# Patient Record
Sex: FEMALE | Race: BLACK OR AFRICAN AMERICAN | Employment: FULL TIME | URBAN - METROPOLITAN AREA
[De-identification: names, ages, dates, MRNs, and addresses within clinical notes are randomized per-mention and may not be internally consistent; named-entity substitution may affect disease eponyms.]

---

## 2017-08-04 ENCOUNTER — HOSPITAL ENCOUNTER (EMERGENCY)
Age: 22
Discharge: LEFT BEFORE DISCHARGE | End: 2017-08-04
Attending: EMERGENCY MEDICINE
Payer: SELF-PAY

## 2017-08-04 VITALS
RESPIRATION RATE: 16 BRPM | OXYGEN SATURATION: 99 % | HEART RATE: 119 BPM | SYSTOLIC BLOOD PRESSURE: 138 MMHG | TEMPERATURE: 98.4 F | HEIGHT: 64 IN | BODY MASS INDEX: 23.56 KG/M2 | DIASTOLIC BLOOD PRESSURE: 94 MMHG | WEIGHT: 138 LBS

## 2017-08-04 DIAGNOSIS — N89.8 VAGINAL DISCHARGE: Primary | ICD-10-CM

## 2017-08-04 DIAGNOSIS — Z53.20 PATIENT LEFT BEFORE TREATMENT COMPLETED: ICD-10-CM

## 2017-08-04 DIAGNOSIS — A59.9 TRICHOMONAS INFECTION: ICD-10-CM

## 2017-08-04 DIAGNOSIS — Z20.2 EXPOSURE TO GONORRHEA: ICD-10-CM

## 2017-08-04 LAB
APPEARANCE UR: ABNORMAL
BACTERIA URNS QL MICRO: ABNORMAL /HPF
BILIRUB UR QL: NEGATIVE
CAOX CRY URNS QL MICRO: ABNORMAL
COLOR UR: YELLOW
EPITH CASTS URNS QL MICRO: ABNORMAL /LPF (ref 0–5)
GLUCOSE UR STRIP.AUTO-MCNC: NEGATIVE MG/DL
HCG UR QL: NEGATIVE
HGB UR QL STRIP: NEGATIVE
KETONES UR QL STRIP.AUTO: NEGATIVE MG/DL
LEUKOCYTE ESTERASE UR QL STRIP.AUTO: ABNORMAL
NITRITE UR QL STRIP.AUTO: NEGATIVE
PH UR STRIP: 5.5 [PH] (ref 5–8)
PROT UR STRIP-MCNC: NEGATIVE MG/DL
RBC #/AREA URNS HPF: ABNORMAL /HPF (ref 0–5)
SP GR UR REFRACTOMETRY: 1.02 (ref 1–1.03)
TRICHOMONAS UR QL MICRO: ABNORMAL
UROBILINOGEN UR QL STRIP.AUTO: 1 EU/DL (ref 0.2–1)
WBC URNS QL MICRO: ABNORMAL /HPF (ref 0–4)

## 2017-08-04 PROCEDURE — 87491 CHLMYD TRACH DNA AMP PROBE: CPT | Performed by: PHYSICIAN ASSISTANT

## 2017-08-04 PROCEDURE — 99282 EMERGENCY DEPT VISIT SF MDM: CPT

## 2017-08-04 PROCEDURE — 81025 URINE PREGNANCY TEST: CPT | Performed by: PHYSICIAN ASSISTANT

## 2017-08-04 PROCEDURE — 81001 URINALYSIS AUTO W/SCOPE: CPT | Performed by: PHYSICIAN ASSISTANT

## 2017-08-04 RX ORDER — ONDANSETRON 4 MG/1
4 TABLET, ORALLY DISINTEGRATING ORAL ONCE
Status: DISCONTINUED | OUTPATIENT
Start: 2017-08-04 | End: 2017-08-04 | Stop reason: HOSPADM

## 2017-08-04 RX ORDER — AZITHROMYCIN 250 MG/1
2000 TABLET, FILM COATED ORAL
Status: DISCONTINUED | OUTPATIENT
Start: 2017-08-04 | End: 2017-08-04 | Stop reason: HOSPADM

## 2017-08-04 NOTE — ED NOTES
Patient eloped after she states that she do not feel like waiting. Patient is informed that the wait is not too long but she she states that she does not desire to wait. Provider and charged nurse notified at this time.

## 2017-08-04 NOTE — LETTER
8/10/2017 2:29 PM 
 
Ms. Martinez Sees [address] [place] [id number] Dear Ms. [address]: The results of your lab work performed in our office were abnormal and we have had difficulty reaching you by telephone. Please contact our office as soon as possible to discuss these results. Sincerely, SHWETA Watson

## 2017-08-04 NOTE — ED PROVIDER NOTES
HPI Comments: 6:32 PM Susanne Corbett is a 25 y.o. female who presents to the ED for STD/STI exposure. Pt states that her spouse had a positive gonorrhea test and would like to be treated. Pt c/o vaginal discharge, but no odor. She does not want a pelvic exam.  She prefers to just be treated. Pt denies vaginal bleeding, pelvic pain, abdominal pain, back pain, fever, chills, CP, SOB, cough, n/v/d/c, or sore throat. Pt has no other sx or complaints. No past medical history on file. No past surgical history on file. No family history on file. Social History     Social History    Marital status: SINGLE     Spouse name: N/A    Number of children: N/A    Years of education: N/A     Occupational History    Not on file. Social History Main Topics    Smoking status: Never Smoker    Smokeless tobacco: Never Used    Alcohol use No      Comment: occasionally    Drug use: No    Sexual activity: Yes     Partners: Male     Other Topics Concern    Not on file     Social History Narrative         ALLERGIES: Pcn [penicillins]    Review of Systems   Constitutional: Negative for activity change, fatigue and fever. STI/STD exposure    HENT: Negative for congestion and rhinorrhea. Eyes: Negative for visual disturbance. Respiratory: Negative for shortness of breath. Cardiovascular: Negative for chest pain and palpitations. Gastrointestinal: Negative for abdominal pain, constipation, diarrhea, nausea and vomiting. Genitourinary: Positive for vaginal discharge. Negative for dysuria and hematuria. Musculoskeletal: Negative for back pain and myalgias. Skin: Negative for rash. Neurological: Negative for dizziness, weakness and light-headedness.        Vitals:    08/04/17 1804   BP: (!) 138/94   Pulse: (!) 119   Resp: 16   Temp: 98.4 °F (36.9 °C)   SpO2: 99%   Weight: 62.6 kg (138 lb)   Height: 5' 4\" (1.626 m)            Physical Exam   Constitutional: She is oriented to person, place, and time. She appears well-developed and well-nourished. No distress. HENT:   Head: Normocephalic and atraumatic. Eyes: Conjunctivae are normal.   Neck: Normal range of motion. Neck supple. Cardiovascular: Regular rhythm and normal heart sounds. Tachycardia present. Pulmonary/Chest: Effort normal and breath sounds normal. No respiratory distress. She has no wheezes. She has no rales. She exhibits no tenderness. Abdominal: Soft. Bowel sounds are normal. She exhibits no distension. There is no tenderness. There is no rebound and no guarding. Genitourinary:   Genitourinary Comments: Declined pelvic exam.   Musculoskeletal: She exhibits no edema or deformity. Neurological: She is alert and oriented to person, place, and time. She has normal reflexes. Skin: Skin is warm and dry. She is not diaphoretic. Psychiatric: She has a normal mood and affect. Nursing note and vitals reviewed. MDM  Number of Diagnoses or Management Options  Exposure to gonorrhea: new and requires workup  Patient left before treatment completed: new and requires workup  Trichomonas infection: new and requires workup  Vaginal discharge: new and requires workup  Diagnosis management comments: Differential Diagnosis:  Candidiasis, trichomoniasis, bacterial vaginitis, GC/Chlamydia, pregnancy, urethritis/UTI, vaginal foreign body, atrophic vaginitis, contact vulvovaginitis, physiologic discharge    Plan:  Pt presents in NAD with elevated BP, elevated HR, and otherwise normal vitals. Declines pelvic exam.  Ordered treatment for GC/CT, but patient eloped prior to receiving medications. Of note, pt had only been in the department for 50 minutes when she eloped from the room.             Amount and/or Complexity of Data Reviewed  Clinical lab tests: ordered and reviewed  Review and summarize past medical records: yes    Risk of Complications, Morbidity, and/or Mortality  Presenting problems: moderate  Diagnostic procedures: moderate  Management options: moderate    Patient Progress  Patient progress: stable    ED Course       Procedures      -------------------------------------------------------------------------------------------------------------------  Orders:  Orders Placed This Encounter    URINALYSIS W/ RFLX MICROSCOPIC     Standing Status:   Standing     Number of Occurrences:   1    HCG URINE, QL     Standing Status:   Standing     Number of Occurrences:   1    CHLAMYDIA/NEISSERIA AMPLIFICATION     Standing Status:   Standing     Number of Occurrences:   1     Order Specific Question:   Specimen type/source     Answer:   Endocervical [[id number]]    URINE MICROSCOPIC ONLY     Standing Status:   Standing     Number of Occurrences:   1    DISCONTD: azithromycin (ZITHROMAX) tablet 2,000 mg     Order Specific Question:   Antibiotic Indications     Answer:   STD infection    DISCONTD: ondansetron (ZOFRAN ODT) tablet 4 mg      Lab Results:   Recent Results (from the past 12 hour(s))   URINALYSIS W/ RFLX MICROSCOPIC    Collection Time: 08/04/17  6:06 PM   Result Value Ref Range    Color YELLOW      Appearance CLOUDY      Specific gravity 1.022 1.005 - 1.030      pH (UA) 5.5 5.0 - 8.0      Protein NEGATIVE  NEG mg/dL    Glucose NEGATIVE  NEG mg/dL    Ketone NEGATIVE  NEG mg/dL    Bilirubin NEGATIVE  NEG      Blood NEGATIVE  NEG      Urobilinogen 1.0 0.2 - 1.0 EU/dL    Nitrites NEGATIVE  NEG      Leukocyte Esterase MODERATE (A) NEG     HCG URINE, QL    Collection Time: 08/04/17  6:06 PM   Result Value Ref Range    HCG urine, Ql. NEGATIVE  NEG     URINE MICROSCOPIC ONLY    Collection Time: 08/04/17  6:06 PM   Result Value Ref Range    WBC 36 to 50 0 - 4 /hpf    RBC 0 to 3 0 - 5 /hpf    Epithelial cells 4+ 0 - 5 /lpf    Bacteria 1+ (A) NEG /hpf    CA Oxalate crystals 2+ (A) NEG    Trichomonas 1+ (A) NEG     Progress Notes:  0620 PM:  Anita Chávez PA-C was at the pt's bedside, assessed the pt and answered the pt's questions regarding treatment.    -------------------------------------------------------------------------------------------------------------------    Disposition:  Diagnosis:   1. Vaginal discharge    2. Exposure to gonorrhea    3. Patient left before treatment completed    4. Trichomonas infection        Disposition: eloped    Follow-up Information     None          There are no discharge medications for this patient. Scribe Attestation:   I, Rd Chiang, am scribing for and in the presence of Patti Strauss [employer] on this day 08/05/17 at 6:32 PM   patricia Hatch    Provider Attestation:  I personally performed the services described in the documentation, reviewed the documentation, as recorded by the scribe in my presence, and it accurately and completely records my words and actions.   Patti Strauss PA-C. 6:32 PM      Signed by: Patricia Hatch, 6:32 PM

## 2017-08-09 LAB
C TRACH RRNA SPEC QL NAA+PROBE: NEGATIVE
N GONORRHOEA RRNA SPEC QL NAA+PROBE: POSITIVE
SPECIMEN SOURCE: ABNORMAL

## 2017-08-10 NOTE — ED NOTES
Called and left messages with both numbers provided for her to return call. Pt needs tx for Gonorrhea. Letter sent.

## 2017-08-20 ENCOUNTER — HOSPITAL ENCOUNTER (EMERGENCY)
Age: 22
Discharge: HOME | End: 2017-08-20
Attending: EMERGENCY MEDICINE
Payer: SELF-PAY

## 2017-08-20 VITALS
SYSTOLIC BLOOD PRESSURE: 121 MMHG | TEMPERATURE: 98.4 F | DIASTOLIC BLOOD PRESSURE: 79 MMHG | RESPIRATION RATE: 14 BRPM | HEART RATE: 85 BPM

## 2017-08-20 DIAGNOSIS — A54.9 NEISSERIA GONORRHEAE: Primary | ICD-10-CM

## 2017-08-20 PROCEDURE — 99283 EMERGENCY DEPT VISIT LOW MDM: CPT

## 2017-08-20 PROCEDURE — 74011000250 HC RX REV CODE- 250: Performed by: NURSE PRACTITIONER

## 2017-08-20 PROCEDURE — 96372 THER/PROPH/DIAG INJ SC/IM: CPT

## 2017-08-20 PROCEDURE — 74011250636 HC RX REV CODE- 250/636: Performed by: NURSE PRACTITIONER

## 2017-08-20 PROCEDURE — 74011250637 HC RX REV CODE- 250/637: Performed by: NURSE PRACTITIONER

## 2017-08-20 RX ORDER — AZITHROMYCIN 250 MG/1
1000 TABLET, FILM COATED ORAL
Status: COMPLETED | OUTPATIENT
Start: 2017-08-20 | End: 2017-08-20

## 2017-08-20 RX ADMIN — AZITHROMYCIN 1000 MG: 250 TABLET, FILM COATED ORAL at 22:05

## 2017-08-20 RX ADMIN — LIDOCAINE HYDROCHLORIDE 250 MG: 10 INJECTION, SOLUTION EPIDURAL; INFILTRATION; INTRACAUDAL; PERINEURAL at 22:09

## 2017-08-21 NOTE — ED PROVIDER NOTES
HPI Comments: Patient is a 25 y.o.  female with the following medical history:   Past Medical History:  No date: STD (female)  Presents to the ED with Abnormal Lab Results - told by her mother that she got a letter to her home saying that she had an STD and to return to get treated. Not currently having any issues or concerns. Her S.O. Is with her at this time but he has also not been treated. She understands that she needs to go to her GYN or the STD clinic to be evaluated for HIV and Hepatitis. Patient is a 25 y.o. female presenting with abnormal lab results. The history is provided by the patient. Abnormal Lab Results   This is a new problem. Pertinent negatives include no chest pain, no abdominal pain, no headaches and no shortness of breath. Past Medical History:   Diagnosis Date    STD (female)        History reviewed. No pertinent surgical history. History reviewed. No pertinent family history. Social History     Social History    Marital status: SINGLE     Spouse name: N/A    Number of children: N/A    Years of education: N/A     Occupational History    Not on file. Social History Main Topics    Smoking status: Current Every Day Smoker     Types: Cigarettes    Smokeless tobacco: Never Used    Alcohol use No      Comment: occasionally    Drug use: No    Sexual activity: Yes     Partners: Male     Other Topics Concern    Not on file     Social History Narrative         ALLERGIES: Pcn [penicillins]    Review of Systems   Constitutional: Negative for activity change, appetite change, chills and fever. HENT: Negative for trouble swallowing and voice change. Eyes: Negative for discharge, redness and visual disturbance. Respiratory: Negative for cough, chest tightness and shortness of breath. Cardiovascular: Negative for chest pain and leg swelling. Gastrointestinal: Negative for abdominal pain, constipation, diarrhea, nausea and vomiting. Genitourinary: Negative for difficulty urinating, dysuria, flank pain, frequency and urgency. Musculoskeletal: Negative for back pain, joint swelling, neck pain and neck stiffness. Skin: Negative for color change, rash and wound. Neurological: Negative for dizziness, speech difficulty and headaches. Psychiatric/Behavioral: Negative for agitation and behavioral problems. The patient is not nervous/anxious. Vitals:    08/20/17 2051   BP: 121/79   Pulse: 85   Resp: 14   Temp: 98.4 °F (36.9 °C)            Physical Exam   Constitutional: She is oriented to person, place, and time. She appears well-developed and well-nourished. No distress. HENT:   Head: Normocephalic. Eyes: Conjunctivae are normal. No scleral icterus. Cardiovascular: Normal rate, regular rhythm and normal heart sounds. No murmur heard. Pulmonary/Chest: Effort normal and breath sounds normal. No stridor. No respiratory distress. Musculoskeletal: She exhibits no edema. Neurological: She is alert and oriented to person, place, and time. Skin: Skin is warm and dry. Psychiatric: She has a normal mood and affect. Her behavior is normal. Judgment and thought content normal.        MDM  Number of Diagnoses or Management Options  Neisseria gonorrheae:   Diagnosis management comments: The encounter diagnosis was Neisseria gonorrheae.       ED Course       Procedures

## 2017-08-21 NOTE — DISCHARGE INSTRUCTIONS
Gonorrhea: Care Instructions  Your Care Instructions  Gonorrhea is a bacterial infection spread through sexual contact (sexually transmitted infection, or STI). It is found most often in the genital area but it can also infect other areas of the body, such as the rectum or throat. While most people with gonorrhea develop symptoms within a few days after infection, some people have no symptoms. Symptoms of gonorrhea include abnormal bleeding, pain or burning during urination, or a thick discharge from the vagina or penis. Antibiotics can cure gonorrhea. Both sex partners need to be treated to keep from passing the infection back and forth. Follow-up care is a key part of your treatment and safety. Be sure to make and go to all appointments, and call your doctor if you are having problems. Its also a good idea to know your test results and keep a list of the medicines you take. How can you care for yourself at home? · Your doctor probably gave you a shot of antibiotics. If your doctor prescribed antibiotic pills, take them as directed. Do not stop taking them just because you feel better. You need to take the full course of antibiotics. · Do not have sexual contact with anyone while you are being treated. If your treatment was a single dose of antibiotics, wait at least 7 days after taking the dose before you have any sexual contact. Even if you use a condom, you may pass the infection back and forth. · Wash your hands if you touch an area of gonorrhea infection. This will help prevent spreading the infection to other parts of your body or to other people. · Tell your sex partner or partners that you have gonorrhea. They should get treated, whether or not they have symptoms of infection. · Talk to your doctor about being tested again for gonorrhea in 3 months. To prevent gonorrhea in the future  · Use latex condoms every time you have sex.  Use them from the beginning to the end of sexual contact. · Talk to your partner before you have sex. Find out if he or she has or is at risk for gonorrhea or any other STI. Keep in mind that a person may be able to spread an STI even if he or she does not have symptoms. · Do not have sex if you are being treated for gonorrhea or any other STI. · Do not have sex with anyone who has symptoms of an STI, such as sores on the genitals or mouth. · Having one sex partner (who does not have STIs and does not have sex with anyone else) is a good way to avoid STIs. When should you call for help? Call 911 anytime you think you may need emergency care. For example, call if:  · You have sudden, severe pain in your belly or pelvis. Call your doctor now or seek immediate medical care if:  · You have new belly or pelvic pain. · You have unusual vaginal bleeding. · You have a fever. · You have a discharge from the vagina or penis. · You have new or increased burning or pain with urination, or you cannot urinate. · You have pain, swelling, or tenderness in the scrotum. · You have joint pain. · You have pus coming from your eyes. Watch closely for changes in your health, and be sure to contact your doctor if:  · You think you may have been exposed to another STI. · Your symptoms get worse or have not improved within 1 week after starting treatment. · You have any new symptoms, such as sores, bumps, rashes, blisters, or warts in the genital or anal area. · You have a new skin rash. Where can you learn more? Go to http://cliff-luli.info/. Enter O327 in the search box to learn more about \"Gonorrhea: Care Instructions. \"  Current as of: March 20, 2017  Content Version: 11.3  © 3795-4707 [employer]. Care instructions adapted under license by [employer] (which disclaims liability or warranty for this information).  If you have questions about a medical condition or this instruction, always ask your healthcare professional. [employer] disclaims any warranty or liability for your use of this information.

## 2018-06-27 ENCOUNTER — HOSPITAL ENCOUNTER (EMERGENCY)
Age: 23
Discharge: HOME | End: 2018-06-27
Attending: EMERGENCY MEDICINE
Payer: SELF-PAY

## 2018-06-27 VITALS
BODY MASS INDEX: 23.04 KG/M2 | RESPIRATION RATE: 16 BRPM | HEART RATE: 92 BPM | OXYGEN SATURATION: 98 % | SYSTOLIC BLOOD PRESSURE: 126 MMHG | DIASTOLIC BLOOD PRESSURE: 86 MMHG | TEMPERATURE: 97.3 F | HEIGHT: 63 IN | WEIGHT: 130 LBS

## 2018-06-27 DIAGNOSIS — F14.10 COCAINE ABUSE (HCC): Primary | ICD-10-CM

## 2018-06-27 PROCEDURE — 99284 EMERGENCY DEPT VISIT MOD MDM: CPT

## 2018-06-27 NOTE — DISCHARGE INSTRUCTIONS
Cocaine Misuse: Care Instructions  Your Care Instructions    Using cocaine can cause physical and mental harm. It can increase your heart rate and blood pressure, which can lead to a heart attack and even death. It can raise your body temperature. You may have nausea, vomiting, and chills. If you smoke cocaine, the fumes can cause breathing problems. If you snort cocaine, it can damage your nasal passages. If you inject cocaine, it can cause an abscess at the injection site or an infection throughout your body. You may become shaky and restless. You also may see or hear things that are not there (hallucinations), or believe things that are not true. When the doctor treated you, he or she may have:  · Watched your symptoms or done tests to find out how much cocaine was in your body. · Treated you to control your heart rate, temperature, and blood pressure. · Given you oxygen to help you breathe. · Given you medicine to settle your thoughts and help keep you calm. The doctor has checked you carefully, but problems can develop later. If you notice any problems or new symptoms, get medical treatment right away. How can you care for yourself at home? · When you use cocaine regularly, your body and brain get used to it. This is called dependency. If you are dependent on this drug, you may have withdrawal symptoms when you stop using it. You may feel drowsy, have vivid dreams, or feel hungry, tired, or depressed. You may also feel confused and have trouble thinking clearly. To help get past these symptoms:  ¨ Get plenty of rest.  ¨ Drink lots of fluids. ¨ Stay active, but don't tire yourself. ¨ Eat a healthy diet. · Talk to your doctor about drug counseling programs that can help you stop using cocaine. · When you stop using cocaine, you may develop abstinence syndrome, which can last for months.  Symptoms of this may include feeling depressed, being tired, having trouble concentrating, and craving cocaine. When should you call for help? Call 911 anytime you think you may need emergency care. For example, call if:  ? · You have symptoms of a heart attack. These may include:  ¨ Chest pain or pressure, or a strange feeling in the chest.  ¨ Sweating. ¨ Shortness of breath. ¨ Nausea or vomiting. ¨ Pain, pressure, or a strange feeling in the back, neck, jaw, or upper belly or in one or both shoulders or arms. ¨ A fast or irregular heartbeat. After you call 911, the  may tell you to chew 1 adult-strength or 2 to 4 low-dose aspirin. Wait for an ambulance. Do not try to drive yourself. ? · You feel you cannot stop from hurting yourself or someone else. ?Call your doctor now or seek immediate medical care if:  ? · You have severe side effects from using cocaine. These may include problems with thinking, such as seeing things that aren't there or thinking that someone is trying to harm you (paranoia). ? · You have new or worse withdrawal symptoms. ? Watch closely for changes in your health, and be sure to contact your doctor if:  ? · You need more help or support to stop. Where can you learn more? Go to http://cliff-luli.info/. Enter T335 in the search box to learn more about \"Cocaine Misuse: Care Instructions. \"  Current as of: November 3, 2016  Content Version: 11.4  © 2610-3519 [employer]. Care instructions adapted under license by [employer] (which disclaims liability or warranty for this information). If you have questions about a medical condition or this instruction, always ask your healthcare professional. [place] [address] any warranty or liability for your use of this information.

## 2018-06-27 NOTE — ED NOTES
Rounds made. Pt repositioned for comfort. NAD noted. Denies any needs at this time. Sitter remains at bedside for patient safety d/t possible withdrawal. Pt denies any SI/HI at this time.

## 2018-06-27 NOTE — ED NOTES
Change into paper scrubs, belongings placed in plastic bags and labeled. 2 bags, locked in locker 7a. Sitter with pt. Pt insistent on keeping coat.

## 2018-06-27 NOTE — ED PROVIDER NOTES
EMERGENCY DEPARTMENT HISTORY AND PHYSICAL EXAM    8:52 AM      Date: 6/27/2018  Patient Name: Josseline Jerome    History of Presenting Illness     Chief Complaint   Patient presents with    Drug Problem         History Provided By: Patient    Chief Complaint: Drug problem  Duration:  Months  Timing:  N/A  Location: n/a  Quality: n/a  Severity: N/a  Modifying Factors: No modifying or aggravating factors were reported. Associated Symptoms: denies any other associated signs or symptoms      Additional History (Context): 8:52 AM Josseline Jerome is a 26 y.o. female with no pertinent PMHx who presents to ED for evaluation of her cocaine addiction problem. Pt snorts cocaine every day. Denies other drug use or ETOH use. Denies taking daily medications or being allergic to any medications. Denies depression, SI or HI. Patient states she has been awake for 3 days. No modifying or aggravating factors were reported. No other concerns or symptoms at this time. PCP: PROVIDER UNKNOWN    Past History     Past Medical History:  Past Medical History:   Diagnosis Date    STD (female)        Past Surgical History:  No past surgical history on file. Family History:  No family history on file. Social History:  Social History   Substance Use Topics    Smoking status: Current Every Day Smoker     Types: Cigarettes    Smokeless tobacco: Never Used    Alcohol use No      Comment: occasionally       Allergies: Allergies   Allergen Reactions    Pcn [Penicillins] Anaphylaxis         Review of Systems       Review of Systems   Constitutional: Negative for chills, fatigue and fever. Positive for drug problem. HENT: Negative for congestion, rhinorrhea and sore throat. Eyes: Negative for visual disturbance. Respiratory: Negative for cough, shortness of breath and wheezing. Cardiovascular: Negative for chest pain, palpitations and leg swelling.    Gastrointestinal: Negative for abdominal pain, diarrhea, nausea and vomiting. Genitourinary: Negative for difficulty urinating and dysuria. Musculoskeletal: Negative for arthralgias. Skin: Negative for rash. Neurological: Negative for weakness and headaches. Psychiatric/Behavioral: Negative for suicidal ideas. All other systems reviewed and are negative. Physical Exam     Visit Vitals    /86 (BP 1 Location: Left arm, BP Patient Position: At rest)    Pulse 92    Temp 97.3 °F (36.3 °C)    Resp 16    Ht 5' 3\" (1.6 m)    Wt 59 kg (130 lb)    SpO2 98%    BMI 23.03 kg/m2       Physical Exam   Constitutional: She is oriented to person, place, and time. She appears well-developed and well-nourished. No distress. Patient is soundly sleeping and easily aroused. HENT:   Head: Normocephalic and atraumatic. Mouth/Throat: Oropharynx is clear and moist and mucous membranes are normal. No oropharyngeal exudate. Eyes: Conjunctivae and EOM are normal. No scleral icterus. Eyes are blood shot. Pupils are 3 mm and reactive bilaterally. Neck: Normal range of motion. Neck supple. No JVD present. No thyromegaly present. Cardiovascular: Normal rate, regular rhythm, S1 normal, S2 normal, normal heart sounds and intact distal pulses. Exam reveals no gallop and no friction rub. No murmur heard. Pulmonary/Chest: Effort normal and breath sounds normal. No accessory muscle usage. No respiratory distress. She has no wheezes. She has no rhonchi. She has no rales. She exhibits no tenderness. Abdominal: Soft. Normal appearance and bowel sounds are normal. She exhibits no distension and no pulsatile midline mass. There is no tenderness. There is no rebound and no guarding. Musculoskeletal: Normal range of motion. Lymphadenopathy:        Head (right side): No submandibular adenopathy present. She has no cervical adenopathy. Neurological: She is alert and oriented to person, place, and time. Moving all extremities.  No obvious deficits or facial asymmetry. Skin: Skin is warm, dry and intact. No rash noted. No erythema. Psychiatric: She has a normal mood and affect. Her speech is normal and behavior is normal.   Nursing note and vitals reviewed. Medical Decision Making   I am the first provider for this patient. I reviewed the vital signs, available nursing notes, past medical history, past surgical history, family history and social history. Vital Signs-Reviewed the patient's vital signs. Pulse Oximetry Analysis -  98% on room air (Interpretation)    Records Reviewed: Nursing Notes and Old Medical Records (Time of Review: 8:52 AM)      Provider Notes (Medical Decision Making):   ASSESSMENT / PLAN:      24y/o AA woman, no significant PMHx presents requesting help quitting Cocaine. Uses daily, snorts it. Has been doing it for past 3dys and not sleeping. Denies other substance use or ETOH use. No Si/HI. Exam, sleeping soundly, easily aroused, NAD, appears tired/somnolent but interactive, Vitals normal. HEENT w/bloodshot eyes, pupils 3mm/reactive, rest of exam bening. Not toxidromic in any way now. -Will speak with [facility] line about resources  -No acute need for medications or labs/imaging  -Anticipate dc home with outpt f/u. Rajiv Friday, MD  EM-IM Physician      ED Course: Progress Notes, Reevaluation, and Consults:  8:43 AM Spoke with Robin from the [facility] [employer], [employer]. She will come to ED and talk to the patient about resources   11:58 AM [employer] talked to patient and gave her outpatient resources. Agrees that the pt is appropriate for outpatient therapy. Vitals are stable and pt hsa been sleeping in the ED comfortably. Diagnosis     Clinical Impression:   1.  Cocaine abuse        Disposition: discharged    Follow-up Information     Follow up With Details Comments [facility] Call in 1 day  [address] [address]  [phone]    [facility] EMERGENCY DEPT  As needed, If symptoms worsen [address] [place] [address]  [phone]           There are no discharge medications for this patient.    _______________________________    Attestations:  Scribe [address] [place] [address] acting as a scribe for and in the presence of David Bishop MD      June 27, 2018 at 8:52 AM       Provider Attestation:      I personally performed the services described in the documentation, reviewed the documentation, as recorded by the scribe in my presence, and it accurately and completely records my words and actions.  June 27, 2018 at 8:52 AM - David Bishop MD    _______________________________

## 2018-07-25 ENCOUNTER — HOSPITAL ENCOUNTER (INPATIENT)
Age: 23
LOS: 6 days | Discharge: HOME | DRG: 885 | End: 2018-07-31
Attending: EMERGENCY MEDICINE | Admitting: PSYCHIATRY & NEUROLOGY
Payer: SELF-PAY

## 2018-07-25 DIAGNOSIS — F32.A DEPRESSION, UNSPECIFIED DEPRESSION TYPE: Primary | ICD-10-CM

## 2018-07-25 PROBLEM — F32.9 MAJOR DEPRESSION: Status: ACTIVE | Noted: 2018-07-25

## 2018-07-25 LAB
AMPHET UR QL SCN: NEGATIVE
ANION GAP SERPL CALC-SCNC: 5 MMOL/L (ref 3–18)
APPEARANCE UR: CLEAR
BACTERIA URNS QL MICRO: ABNORMAL /HPF
BARBITURATES UR QL SCN: NEGATIVE
BASOPHILS # BLD: 0 K/UL (ref 0–0.1)
BASOPHILS NFR BLD: 0 % (ref 0–2)
BENZODIAZ UR QL: NEGATIVE
BILIRUB UR QL: NEGATIVE
BUN SERPL-MCNC: 9 MG/DL (ref 7–18)
BUN/CREAT SERPL: 11 (ref 12–20)
CALCIUM SERPL-MCNC: 8.3 MG/DL (ref 8.5–10.1)
CANNABINOIDS UR QL SCN: POSITIVE
CHLORIDE SERPL-SCNC: 107 MMOL/L (ref 100–108)
CO2 SERPL-SCNC: 29 MMOL/L (ref 21–32)
COCAINE UR QL SCN: POSITIVE
COLOR UR: YELLOW
CREAT SERPL-MCNC: 0.84 MG/DL (ref 0.6–1.3)
DIFFERENTIAL METHOD BLD: ABNORMAL
EOSINOPHIL # BLD: 0.1 K/UL (ref 0–0.4)
EOSINOPHIL NFR BLD: 1 % (ref 0–5)
EPITH CASTS URNS QL MICRO: ABNORMAL /LPF (ref 0–5)
ERYTHROCYTE [DISTWIDTH] IN BLOOD BY AUTOMATED COUNT: 13.8 % (ref 11.6–14.5)
ETHANOL SERPL-MCNC: <3 MG/DL (ref 0–3)
GLUCOSE SERPL-MCNC: 80 MG/DL (ref 74–99)
GLUCOSE UR STRIP.AUTO-MCNC: NEGATIVE MG/DL
HCG UR QL: NEGATIVE
HCT VFR BLD AUTO: 39.6 % (ref 35–45)
HDSCOM,HDSCOM: ABNORMAL
HGB BLD-MCNC: 13.5 G/DL (ref 12–16)
HGB UR QL STRIP: NEGATIVE
KETONES UR QL STRIP.AUTO: NEGATIVE MG/DL
LEUKOCYTE ESTERASE UR QL STRIP.AUTO: ABNORMAL
LYMPHOCYTES # BLD: 1.8 K/UL (ref 0.9–3.6)
LYMPHOCYTES NFR BLD: 18 % (ref 21–52)
MCH RBC QN AUTO: 32.1 PG (ref 24–34)
MCHC RBC AUTO-ENTMCNC: 34.1 G/DL (ref 31–37)
MCV RBC AUTO: 94.3 FL (ref 74–97)
METHADONE UR QL: NEGATIVE
MONOCYTES # BLD: 0.6 K/UL (ref 0.05–1.2)
MONOCYTES NFR BLD: 6 % (ref 3–10)
NEUTS SEG # BLD: 7.6 K/UL (ref 1.8–8)
NEUTS SEG NFR BLD: 75 % (ref 40–73)
NITRITE UR QL STRIP.AUTO: POSITIVE
OPIATES UR QL: NEGATIVE
PCP UR QL: NEGATIVE
PH UR STRIP: 7 [PH] (ref 5–8)
PLATELET # BLD AUTO: 315 K/UL (ref 135–420)
PMV BLD AUTO: 9.5 FL (ref 9.2–11.8)
POTASSIUM SERPL-SCNC: 4 MMOL/L (ref 3.5–5.5)
PROT UR STRIP-MCNC: NEGATIVE MG/DL
RBC # BLD AUTO: 4.2 M/UL (ref 4.2–5.3)
RBC #/AREA URNS HPF: ABNORMAL /HPF (ref 0–5)
SODIUM SERPL-SCNC: 141 MMOL/L (ref 136–145)
SP GR UR REFRACTOMETRY: 1.02 (ref 1–1.03)
UROBILINOGEN UR QL STRIP.AUTO: 1 EU/DL (ref 0.2–1)
WBC # BLD AUTO: 10.2 K/UL (ref 4.6–13.2)
WBC URNS QL MICRO: ABNORMAL /HPF (ref 0–5)

## 2018-07-25 PROCEDURE — 65220000003 HC RM SEMIPRIVATE PSYCH

## 2018-07-25 PROCEDURE — 85025 COMPLETE CBC W/AUTO DIFF WBC: CPT | Performed by: NURSE PRACTITIONER

## 2018-07-25 PROCEDURE — 99285 EMERGENCY DEPT VISIT HI MDM: CPT

## 2018-07-25 PROCEDURE — 80307 DRUG TEST PRSMV CHEM ANLYZR: CPT | Performed by: NURSE PRACTITIONER

## 2018-07-25 PROCEDURE — 80048 BASIC METABOLIC PNL TOTAL CA: CPT | Performed by: NURSE PRACTITIONER

## 2018-07-25 PROCEDURE — 74011250637 HC RX REV CODE- 250/637: Performed by: EMERGENCY MEDICINE

## 2018-07-25 PROCEDURE — 74011250637 HC RX REV CODE- 250/637: Performed by: PSYCHIATRY & NEUROLOGY

## 2018-07-25 PROCEDURE — 81025 URINE PREGNANCY TEST: CPT | Performed by: NURSE PRACTITIONER

## 2018-07-25 PROCEDURE — 81001 URINALYSIS AUTO W/SCOPE: CPT | Performed by: NURSE PRACTITIONER

## 2018-07-25 RX ORDER — LORAZEPAM 1 MG/1
1-2 TABLET ORAL
Status: DISCONTINUED | OUTPATIENT
Start: 2018-07-25 | End: 2018-07-31 | Stop reason: HOSPADM

## 2018-07-25 RX ORDER — ACETAMINOPHEN 325 MG/1
650 TABLET ORAL ONCE
Status: COMPLETED | OUTPATIENT
Start: 2018-07-25 | End: 2018-07-25

## 2018-07-25 RX ORDER — IBUPROFEN 400 MG/1
400 TABLET ORAL
Status: DISCONTINUED | OUTPATIENT
Start: 2018-07-25 | End: 2018-07-31 | Stop reason: HOSPADM

## 2018-07-25 RX ORDER — NITROFURANTOIN MACROCRYSTALS 50 MG/1
100 CAPSULE ORAL 4 TIMES DAILY
Status: COMPLETED | OUTPATIENT
Start: 2018-07-25 | End: 2018-07-30

## 2018-07-25 RX ORDER — HALOPERIDOL 5 MG/ML
5 INJECTION INTRAMUSCULAR
Status: DISCONTINUED | OUTPATIENT
Start: 2018-07-25 | End: 2018-07-31 | Stop reason: HOSPADM

## 2018-07-25 RX ORDER — HALOPERIDOL 5 MG/1
5 TABLET ORAL
Status: DISCONTINUED | OUTPATIENT
Start: 2018-07-25 | End: 2018-07-31 | Stop reason: HOSPADM

## 2018-07-25 RX ORDER — TRAZODONE HYDROCHLORIDE 50 MG/1
50 TABLET ORAL
Status: DISCONTINUED | OUTPATIENT
Start: 2018-07-25 | End: 2018-07-31 | Stop reason: HOSPADM

## 2018-07-25 RX ORDER — NITROFURANTOIN MACROCRYSTALS 50 MG/1
100 CAPSULE ORAL
Status: COMPLETED | OUTPATIENT
Start: 2018-07-25 | End: 2018-07-25

## 2018-07-25 RX ORDER — LORAZEPAM 2 MG/ML
1-2 INJECTION INTRAMUSCULAR
Status: DISCONTINUED | OUTPATIENT
Start: 2018-07-25 | End: 2018-07-31 | Stop reason: HOSPADM

## 2018-07-25 RX ADMIN — ACETAMINOPHEN 650 MG: 325 TABLET, FILM COATED ORAL at 11:58

## 2018-07-25 RX ADMIN — NITROFURANTOIN MACROCRYSTALS 100 MG: 50 CAPSULE ORAL at 11:58

## 2018-07-25 RX ADMIN — TRAZODONE HYDROCHLORIDE 50 MG: 50 TABLET ORAL at 20:15

## 2018-07-25 RX ADMIN — NITROFURANTOIN MACROCRYSTALS 100 MG: 50 CAPSULE ORAL at 20:15

## 2018-07-25 NOTE — IP AVS SNAPSHOT
[address] [address] [address] Patient: Jacqueline Ca MRN: [id number] [id number] About your hospitalization You were admitted on:  July 25, 2018 You last received care in the:  [facility] 1 SPECIAL TRT 1 You were discharged on:  July 31, 2018 Why you were hospitalized Your primary diagnosis was:  Not on File Your diagnoses also included: Major Depression Follow-up Information Follow up With Details Comments Contact [facility] On 8/6/2018 3:00pm initial intake appointment for continuation of services and treatment. [address] 
[phone] [facility] On 8/9/2018 2:30pm appointment with Dr Chintan Edward for medication management. [address] 
[phone] None   None (395) Patient stated that they have no PCP Discharge Orders None A check angela indicates which time of day the medication should be taken. My Medications START taking these medications Instructions Each Dose to Equal  
 Morning Noon Evening Bedtime ARIPiprazole 5 mg tablet Commonly known as:  ABILIFY Start taking on:  8/1/2018 Your next dose is:  08/01/2018 Take 1 Tab by mouth daily. Indications: DEPRESSION TREATMENT ADJUNCT  
 5 mg With breakfast  
   
   
   
  
 buPROPion  mg tablet Commonly known as:  Edwin Crew Start taking on:  8/1/2018 Your next dose is:  08/01/2018 Take 1 Tab by mouth every morning. Indications: major depressive disorder, Schizoaffective Disorder 150 mg Before breakfast.  
   
   
   
  
  
  
Where to Get Your Medications Information on where to get these meds will be given to you by the nurse or doctor. ! Ask your nurse or doctor about these medications ARIPiprazole 5 mg tablet buPROPion  mg tablet Discharge Instructions ***IMPORTANT NUMBERS*** [address] [facility] 
      [phone] 1917 \[facility]\"" 
     [phone] Suicide Prevention [phone] Patient is alert x3 and ambulatory. Patient has copy of discharge papers with follow up appt. Patient was given 14 days worth of medications at time of discharge. Patient has all personal belongings and has signed form. Patient denies thoughts of self harm or harm to others at this time. Patient armband taken and shredded. Patient discharged to to home address and transportation provided by family friend. Introducing [facility]! [employer] introduces [facility] patient portal. Now you can access parts of your medical record, email your doctor's office, and request medication refills online. 1. In your internet browser, go to [email or web address] 2. Click on the First Time User? Click Here link in the Sign In box. You will see the New Member Sign Up page. 3. Enter your [facility] Access Code exactly as it appears below. You will not need to use this code after youve completed the sign-up process. If you do not sign up before the expiration date, you must request a new code. · [facility] Access Code: [email or web address] Expires: 9/25/2018  7:53 AM 
 
4. Enter the last four digits of your Social Security Number (xxxx) and Date of Birth (mm/dd/yyyy) as indicated and click Submit. You will be taken to the next sign-up page. 5. Create a [facility] Records ID. This will be your [facility] login ID and cannot be changed, so think of one that is secure and easy to remember. 6. Create a [facility] password. You can change your password at any time. 7. Enter your Password Reset Question and Answer. This can be used at a later time if you forget your password. 8. Enter your e-mail address.  You will receive e-mail notification when new information is available in [employer]. 9. Click Sign Up. You can now view and download portions of your medical record. 10. Click the Download Summary menu link to download a portable copy of your medical information. If you have questions, please visit the Frequently Asked Questions section of the [employer] website. Remember, [employer] is NOT to be used for urgent needs. For medical emergencies, dial 911. Now available from your iPhone and Android! Introducing Baldev Shah As a [employer] patient, I wanted to make you aware of our electronic visit tool called Baldev Shah. [employer] 24/7 allows you to connect within minutes with a medical provider 24 hours a day, seven days a week via a mobile device or tablet or logging into a secure website from your computer. You can access Baldev Shah from anywhere in the [place]. A virtual visit might be right for you when you have a simple condition and feel like you just dont want to get out of bed, or cant get away from work for an appointment, when your regular [employer] provider is not available (evenings, weekends or holidays), or when youre out of town and need minor care. Electronic visits cost only $49 and if the [employer] 24/7 provider determines a prescription is needed to treat your condition, one can be electronically transmitted to a nearby pharmacy*. Please take a moment to enroll today if you have not already done so. The enrollment process is free and takes just a few minutes. To enroll, please download the [employer] 24/7 rickie to your tablet or phone, or visit [email or web address] to enroll on your computer. And, as an [address] [place] [address] patient with a [employer] account, the results of your visits will be scanned into your electronic medical record and your primary care provider will be able to view the scanned results. We urge you to continue to see your regular [employer] provider for your ongoing medical care. And while your primary care provider may not be the one available when you seek a [employer] virtual visit, the peace of mind you get from getting a real diagnosis real time can be priceless. For more information on [employer], view our Frequently Asked Questions (FAQs) at [email or web address]. Sincerely, 
 
Arelis Mcclain MD 
Chief Medical Officer Tim8 Amy Sue *:  certain medications cannot be prescribed via [employer] Providers Seen During Your Hospitalization Provider Specialty Primary office phone Rolando Acevedo MD Emergency Medicine [phone] Kendra Hernandez MD Psychiatry [phone] Your Primary Care Physician (PCP) Primary Care Physician Office Phone Office Fax NONE ** None ** ** None ** You are allergic to the following Allergen Reactions Pcn (Penicillins) Anaphylaxis Recent Documentation Height Weight BMI OB Status Smoking Status 1.651 m 63.5 kg 23.3 kg/m2 Having regular periods Current Every Day Smoker Emergency Contacts Name Discharge Info Relation Home Work Mobile [address] CAREGIVER [3] Parent [1] [phone] Patient Belongings The following personal items are in your possession at time of discharge: 
  Dental Appliances: None         Home Medications: None   Jewelry: None  Clothing: Slippers, Pants (hoodie locker 103/1)    Other Valuables: None  Personal Items Sent to Safe: none Please provide this summary of care documentation to your next provider. Signatures-by signing, you are acknowledging that this After Visit Summary has been reviewed with you and you have received a copy. Patient Signature:  ____________________________________________________________ Date:  ____________________________________________________________  
  
Burlene Sunday Provider Signature:  ____________________________________________________________ Date:  ____________________________________________________________

## 2018-07-25 NOTE — IP AVS SNAPSHOT
Summary of Care Report The Summary of Care report has been created to help improve care coordination. Users with access to [employer] or Patient Communicator [facility] (Web-based application) may access additional patient information including the Discharge Summary. If you are not currently a [address] user and need more information, please call the number listed below in the [place] [address] section and ask to be connected with Medical Records. Facility Information Name Address Phone [address] [place] [address] [address] [facility] [address] [phone] Patient Information Patient Name Sex  Jaxon Licea ([id number]) Female 1995 Discharge Information Admitting Provider Service Area Unit Marcelino Lew MD / [address]  [phone] Discharge Provider Discharge Date/Time Discharge Disposition Destination (none) 2018 (Pending) AHR (none) Patient Language Language ENGLISH [13] Hospital Problems as of 2018  Reviewed: 2011  4:34 PM by Andrew Peace MD  
  
  
  
 Class Noted - Resolved Last Modified POA Active Problems Major depression  2018 - Present 2018 by Marcelino Lew MD Unknown Entered by Marcelino Lew MD  
  
Non-Hospital Problems as of 2018  Reviewed: 2011  4:34 PM by Andrew Peace MD  
  
  
  
 Class Noted - Resolved Last Modified Active Problems Amenorrhea, secondary  2011 - Present 2011 by Darwin Reardon. Entered by Darwin Reardon. Pregnancy, normal  2011 - Present 2011 by Darwin Reardon. Entered by Darwin Reardon. Teen pregnancy  2011 - Present 2011 by Darwin Reardon. Entered by Darwin Reardon. You are allergic to the following Allergen Reactions Pcn (Penicillins) Anaphylaxis Current Discharge Medication List  
  
START taking these medications Dose & Instructions Dispensing Information Comments ARIPiprazole 5 mg tablet Commonly known as:  ABILIFY Start taking on:  8/1/2018 Dose:  5 mg Take 1 Tab by mouth daily. Indications: DEPRESSION TREATMENT ADJUNCT Quantity:  30 Tab Refills:  0  
   
 buPROPion  mg tablet Commonly known as:  Katharina Veras Start taking on:  8/1/2018 Dose:  150 mg Take 1 Tab by mouth every morning. Indications: major depressive disorder, Schizoaffective Disorder Quantity:  30 Tab Refills:  0 Follow-up Information Follow up With Details Comments Contact Info [facility] On 8/6/2018 3:00pm initial intake appointment for continuation of services and treatment. [address] 
[phone] [facility] On 8/9/2018 2:30pm appointment with Dr Carl Marroquin for medication management. [address] 
[phone] None   None (395) Patient stated that they have no PCP Discharge Instructions ***IMPORTANT NUMBERS*** [facility] 
      [phone] [facility] 
     [phone] Suicide Prevention [phone] Patient is alert x3 and ambulatory. Patient has copy of discharge papers with follow up appt. Patient was given 14 days worth of medications at time of discharge. Patient has all personal belongings and has signed form. Patient denies thoughts of self harm or harm to others at this time. Patient armband taken and shredded. Patient discharged to to home address and transportation provided by family friend. Chart Review Routing History No Routing History on File

## 2018-07-25 NOTE — BH NOTES
Pt arrived on unit with significant other and hospital security. Patient was happy, smiling, and laughing with friend. Pts vitals were stable upon admission. Pts belongins where reviewed with the patient. All valuables were given to boyfriend to take home. Pt was appropriate and cooperative during the initial interview. Pt stated she was here because \"she just needed a break. \" Her stressors that she voiced is having custody issues with her mother over her 10year old daughter. Pt became tearful while talking about her daughter, but once conversation was changed she became happy and upbeat once again. Pt states she is not suicidal at this time, just depressed about life. She denied use of illegal drugs, alcohol, and tobacco products. Pt stated her only chronic condition was depression which she has had for 2 years. Pt currently resides with her boy friend. Her daughter is currently residing with the patients mother. 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

## 2018-07-25 NOTE — ED PROVIDER NOTES
EMERGENCY DEPARTMENT HISTORY AND PHYSICAL EXAM    1:31 PM      Date: 7/25/2018  Patient Name: Angelic Castro    History of Presenting Illness     Chief Complaint   Patient presents with    Suicidal       History Provided By: Patient    Chief Complaint: Suicidal Ideation  Duration:  Weeks  Timing:  Constant and Worsening  Location: Generalized  Quality: N/A  Severity: Moderate  Modifying Factors: Recent family stress and arguments with her mother triggered suicidal thoughts  Associated Symptoms: denies any other associated signs or symptoms      Additional History (Context): Angelic Castro is a 26 y.o. female with No significant past medical history who presents to the ED complaining of constant, worsening, suicidal ideation that began 2 weeks ago, with no plan. The patient states that recent family stress and arguments with her mother triggered her suicidal thoughts. She remarks that she has never had SI before. She does not have and therapist or psychiatrist and is not on psychiatric medication. The patient denies HI, hallucinations, EtOH use, drug use, dysuria, and any pain. No other complaints or concerns at this time. PCP: None      Past History     Past Medical History:  Past Medical History:   Diagnosis Date    STD (female)        Past Surgical History:  History reviewed. No pertinent surgical history. Family History:  History reviewed. No pertinent family history. Social History:  Social History   Substance Use Topics    Smoking status: Current Every Day Smoker     Types: Cigarettes    Smokeless tobacco: Never Used    Alcohol use No      Comment: occasionally       Allergies: Allergies   Allergen Reactions    Pcn [Penicillins] Anaphylaxis         Review of Systems       Review of Systems   Constitutional: Negative for fever. Genitourinary: Negative for dysuria. Psychiatric/Behavioral: Positive for suicidal ideas. Negative for hallucinations.    All other systems reviewed and are negative. Physical Exam     Patient Vitals for the past 12 hrs:   Temp Pulse Resp BP SpO2   07/25/18 0954 98.6 °F (37 °C) 85 16 118/82 100 %         Physical Exam   Constitutional: She is oriented to person, place, and time. She appears well-developed. HENT:   Head: Normocephalic and atraumatic. Eyes: Conjunctivae and EOM are normal.   Neck: Normal range of motion. Cardiovascular: Normal heart sounds. Exam reveals no gallop and no friction rub. No murmur heard. Pulmonary/Chest: Effort normal and breath sounds normal. No stridor. Abdominal: Soft. There is no tenderness. Musculoskeletal: Normal range of motion. She exhibits no tenderness. Neurological: She is alert and oriented to person, place, and time. Skin: Skin is warm and dry. She is not diaphoretic. Psychiatric: She has a normal mood and affect. Her behavior is normal. She expresses suicidal ideation. Odd affect  Inappropriately smiling  Depressed mood   Nursing note and vitals reviewed.         Diagnostic Study Results     Labs -  Recent Results (from the past 12 hour(s))   URINALYSIS W/ RFLX MICROSCOPIC    Collection Time: 07/25/18 10:00 AM   Result Value Ref Range    Color YELLOW      Appearance CLEAR      Specific gravity 1.024 1.005 - 1.030      pH (UA) 7.0 5.0 - 8.0      Protein NEGATIVE  NEG mg/dL    Glucose NEGATIVE  NEG mg/dL    Ketone NEGATIVE  NEG mg/dL    Bilirubin NEGATIVE  NEG      Blood NEGATIVE  NEG      Urobilinogen 1.0 0.2 - 1.0 EU/dL    Nitrites POSITIVE (A) NEG      Leukocyte Esterase MODERATE (A) NEG     DRUG SCREEN, URINE    Collection Time: 07/25/18 10:00 AM   Result Value Ref Range    BENZODIAZEPINES NEGATIVE  NEG      BARBITURATES NEGATIVE  NEG      THC (TH-CANNABINOL) POSITIVE (A) NEG      OPIATES NEGATIVE  NEG      PCP(PHENCYCLIDINE) NEGATIVE  NEG      COCAINE POSITIVE (A) NEG      AMPHETAMINES NEGATIVE  NEG      METHADONE NEGATIVE  NEG      HDSCOM (NOTE)    HCG URINE, QL    Collection Time: 07/25/18 10:00 AM   Result Value Ref Range    HCG urine, QL NEGATIVE  NEG     URINE MICROSCOPIC ONLY    Collection Time: 07/25/18 10:00 AM   Result Value Ref Range    WBC 25 to 35 0 - 5 /hpf    RBC 1 to 3 0 - 5 /hpf    Epithelial cells 1+ 0 - 5 /lpf    Bacteria 4+ (A) NEG /hpf   METABOLIC PANEL, BASIC    Collection Time: 07/25/18 11:17 AM   Result Value Ref Range    Sodium 141 136 - 145 mmol/L    Potassium 4.0 3.5 - 5.5 mmol/L    Chloride 107 100 - 108 mmol/L    CO2 29 21 - 32 mmol/L    Anion gap 5 3.0 - 18 mmol/L    Glucose 80 74 - 99 mg/dL    BUN 9 7.0 - 18 MG/DL    Creatinine 0.84 0.6 - 1.3 MG/DL    BUN/Creatinine ratio 11 (L) 12 - 20      GFR est AA >60 >60 ml/min/1.73m2    GFR est non-AA >60 >60 ml/min/1.73m2    Calcium 8.3 (L) 8.5 - 10.1 MG/DL   CBC WITH AUTOMATED DIFF    Collection Time: 07/25/18 11:17 AM   Result Value Ref Range    WBC 10.2 4.6 - 13.2 K/uL    RBC 4.20 4. 20 - 5.30 M/uL    HGB 13.5 12.0 - 16.0 g/dL    HCT 39.6 35.0 - 45.0 %    MCV 94.3 74.0 - 97.0 FL    MCH 32.1 24.0 - 34.0 PG    MCHC 34.1 31.0 - 37.0 g/dL    RDW 13.8 11.6 - 14.5 %    PLATELET 898 489 - 311 K/uL    MPV 9.5 9.2 - 11.8 FL    NEUTROPHILS 75 (H) 40 - 73 %    LYMPHOCYTES 18 (L) 21 - 52 %    MONOCYTES 6 3 - 10 %    EOSINOPHILS 1 0 - 5 %    BASOPHILS 0 0 - 2 %    ABS. NEUTROPHILS 7.6 1.8 - 8.0 K/UL    ABS. LYMPHOCYTES 1.8 0.9 - 3.6 K/UL    ABS. MONOCYTES 0.6 0.05 - 1.2 K/UL    ABS. EOSINOPHILS 0.1 0.0 - 0.4 K/UL    ABS. BASOPHILS 0.0 0.0 - 0.1 K/UL    DF AUTOMATED     ETHYL ALCOHOL    Collection Time: 07/25/18 11:17 AM   Result Value Ref Range    ALCOHOL(ETHYL),SERUM <3 0 - 3 MG/DL       Radiologic Studies -   No orders to display         Medical Decision Making     Provider Notes (Medical Decision Making): Based on my history, physical exam, and diagnostic evaluation, the patient appears to have symptoms consistent with severe depression. There has been suicidal ideation without a plan.  They appear to meet criteria for admission and appears to be gravely disabled by their psychiatric illness. The patient does not have any clinical evidence of head trauma, infection, significant electrolyte abnormalities or toxidromes. My evaluation is that the pt is at risk for harming self  if discharged. After history, physical exam and required diagnostic evaluation; pt appears to be medically stable for psychiatric admission. She was seen by the psychiatric  who discussed the case with the psychiatrist on call. Pt was admitted to the psychatric floor in stable condition. I am the first provider for this patient. I reviewed the vital signs, available nursing notes, past medical history, past surgical history, family history and social history. Vital Signs-Reviewed the patient's vital signs. Pulse Oximetry Analysis -  100% on room air (Interpretation) WNL    Records Reviewed: Nursing Notes (Time of Review: 1:31 PM)    ED Course: Progress Notes, Reevaluation, and Consults:  11:51 AM paged crisis for 21 and she is medically clear. Diagnosis     Clinical Impression: Depression    Disposition: Admit    Follow-up Information     None           Patient's Medications    No medications on file     _______________________________    Attestations:  Scribe Lee Silber acting as a scribe for and in the presence of Silviano Singer MD      July 25, 2018 at 1:31 PM       Provider Attestation:      I personally performed the services described in the documentation, reviewed the documentation, as recorded by the scribe in my presence, and it accurately and completely records my words and actions.  July 25, 2018 at 1:31 PM - Silviano Singer MD    _______________________________

## 2018-07-25 NOTE — BSMART NOTE
Comprehensive Assessment Form Part 1    Section I - Disposition      The Medical Doctor to Psychiatrist conference was completed. The Medical Doctor is in agreement with Psychiatrist disposition because of Patient reports suicidal thoughts with a plan. [place] [facility] [place]  The on-call Psychiatrist consulted was Dr. Naye Hudson  The admitting Psychiatrist will be Dr. Dr. Naye Hudson. The admitting Diagnosis is MDD   Admitted to room 103/01  Unit STU1      Section II - Integrated Summary  Summary:  Patient is a 26year old [facility] [employer] female who presents with suicidal ideations. Patient is alone and reports she is \"tired of it all and I feel like giving up\". Patients mood and affect is flat. Patient reports she was driven to the hospital by her boyfriend who began noticing her voicing strange thoughts about harming herself. Patient reports feelings of worthlessness, hopelessness and helplessness. Patient reports situational stressors related to finances. Patient reports she has a 10year old daughter who currently resides with her mother. As interview progressed patient displayed thought blocking pattern when answering  questions. Patient is pleasant and cooperative with interview. Patient denies a history of hospitalizations and mental illness. Patient endorses SI. Patient denies HI/AVH. Patient is oriented to person place and situation. The patients memory shows no evidence of impairment. The patient is an immediate threat to herself and is having thoughts of self harm with her depression. The patient is deemed competent to provide informed consent. The Chief Complaint is Suicidal thoughts. Section V - Substance Abuse  The patient reports she is not using substances.       Keyanna Mukherjee, LOUIE, LCSW-E    [phone]

## 2018-07-26 PROCEDURE — 74011250637 HC RX REV CODE- 250/637: Performed by: PSYCHIATRY & NEUROLOGY

## 2018-07-26 PROCEDURE — 65220000003 HC RM SEMIPRIVATE PSYCH

## 2018-07-26 RX ORDER — ARIPIPRAZOLE 5 MG/1
5 TABLET ORAL DAILY
Status: DISCONTINUED | OUTPATIENT
Start: 2018-07-27 | End: 2018-07-26

## 2018-07-26 RX ORDER — ARIPIPRAZOLE 5 MG/1
5 TABLET ORAL DAILY
Status: DISCONTINUED | OUTPATIENT
Start: 2018-07-26 | End: 2018-07-31 | Stop reason: HOSPADM

## 2018-07-26 RX ORDER — BUPROPION HYDROCHLORIDE 150 MG/1
150 TABLET ORAL
Status: DISCONTINUED | OUTPATIENT
Start: 2018-07-26 | End: 2018-07-31 | Stop reason: HOSPADM

## 2018-07-26 RX ADMIN — BUPROPION HYDROCHLORIDE 150 MG: 150 TABLET, FILM COATED, EXTENDED RELEASE ORAL at 15:52

## 2018-07-26 RX ADMIN — NITROFURANTOIN MACROCRYSTALS 100 MG: 50 CAPSULE ORAL at 08:20

## 2018-07-26 RX ADMIN — NITROFURANTOIN MACROCRYSTALS 100 MG: 50 CAPSULE ORAL at 15:52

## 2018-07-26 RX ADMIN — NITROFURANTOIN MACROCRYSTALS 100 MG: 50 CAPSULE ORAL at 20:23

## 2018-07-26 RX ADMIN — NITROFURANTOIN MACROCRYSTALS 100 MG: 50 CAPSULE ORAL at 11:51

## 2018-07-26 RX ADMIN — ARIPIPRAZOLE 5 MG: 5 TABLET ORAL at 15:53

## 2018-07-26 RX ADMIN — TRAZODONE HYDROCHLORIDE 50 MG: 50 TABLET ORAL at 20:22

## 2018-07-26 RX ADMIN — LORAZEPAM 1 MG: 1 TABLET ORAL at 10:29

## 2018-07-26 NOTE — H&P
HPI: Frank Christensen is a 26 y.o. AA female admitted for SI. She is an inconsistent historian. The  interviewing her in the ED noted Mario Hoffmann said she is 'tired of it all and I feel like giving up'. Patients mood and affect is flat. Patient reports she was driven to the hospital by her boyfriend who began noticing her voicing strange thoughts about harming herself. Patient reports feelings of worthlessness, hopelessness and helplessness. Patient reports situational stressors related to finances. Patient reports she has a 10year old daughter who currently resides with her mother. As interview progressed patient displayed thought blocking pattern when answering  questions. Patient endorses SI.\" Once she arrived on the unit nursing noted her \"to be happy, smiling, and laughing with her boyfriend. At that time she stated she was here because 'she just needed a break.' Her stressors that she voiced is having custody issues with her mother over her 10year old daughter. Pt became tearful while talking about her daughter, but once conversation was changed she became happy and upbeat once again. Pt states she is not suicidal at this time, just depressed about life. She denied use of illegal drugs, alcohol, and tobacco products. Pt stated her only chronic condition was depression which she has had for 2 years. \" On my admission interview she was inappropriately smiling while saying she was depressed. She said she had been depressed for only a few days and that she had insomnia. She denied previous psychiatric history, previous suicide attempts, SI, HI, AH, or VH. She denied a history of symptoms of sal. PPH: She denies any past psychitric history. PMH: History of HTN, Migraines, obesity, and hyperlipidemia     SA: She denies any alcohol or drug use, however, on admission her drug screen was positive for Cocaine and THC. SH: Her 10 y.o. Daughter lives with her mother.  She reports financial stress over her mother wanting her to pay for her daughter's upcoming birthday party. She reports that she is a \"sugar baby\" and that she has lived for 3 years with her \"sugar daddy,\" who is 48. She says she likes older men and has had other \"sugar daddies\" in the past. She says he takes care of her and she doesn't work. Prior to this relationship she worked at [employer]. MSE: Young AA female with good eye contact but inappropriately smiling and laughing as she talks about being depressed. Thought appeared to be goal directed and without tangentiality, circumstantiality, or flight of ideas. Mood is labile with inappropriate affect. Denies SI, HI, AH, or VH. No illusions or delusions. Judgement and insight fair to poor. Assessment:  Schizoaffective Disorder, Bipolar Type F25.0  I believe this is the most likely diagnosis given her inappropriate affect and thought blocking noted in the ED. Plan:  Wellbutrin  mg. every day  Abilify 5 mg. every day    Estimated length of stay: 1 week. Iraj Baum MD  Psychiatry

## 2018-07-26 NOTE — BSMART NOTE
OCCUPATIONAL THERAPY PROGRESS NOTE  Group Time:  0889  Attendance: The patient attended 1/3 of group. The patient left and returned to activity at least once several times. Participation:  The patient participated with moderate elaboration in the activity. Attention:  The patient needed frequent redirection to activity. .  Interaction:  The patient frequently interacts with others. The patient shows limited awareness of others. Talkative with peers, laughing, unable to focus on activity, somewhat silly in responses, left and returned several times before finally leaving when redirected to be serious in responses and focus as she was disruptive and talking with peers over others.

## 2018-07-26 NOTE — BH NOTES
GROUP THERAPY PROGRESS NOTE    Marily Shields Participated in Goals Group. Group time: 30 minutes    Personal goal for participation: Share goals and positive characteristics about self. Goal orientation: personal    Group therapy participation: active    Therapeutic interventions reviewed and discussed:  \" Life in my hands\". The group traced their hand on the back and front of a piece of paper. They put five positive characteristics ,and also symbols to describe themselves. The other hand was used to put five things that they could work on to improve their well being. They shared their \" Life in my hands\" with the staff ( me) , and also the rest of the group.     Impression of participation: The patient was extremely active and positive in group

## 2018-07-26 NOTE — BH NOTES
MHT NOTE: The patient has had a good day today as evidenced by her having a positive attitude in both the morning and afternoon, and also remaining out in the day room for the majority of the day playing games and socializing with surrounding patients with a contagious smile on her face. The patient participated in breakfast, lunch and also the groups that were provided today by staff. The patient complied with utilizing the non skid socks that were provided for her safety, and did not experience any falls this shift. The patient will continue to be monitored for location and safety for the remainder of the shift.

## 2018-07-26 NOTE — BH NOTES
GROUP THERAPY PROGRESS NOTE    Jacqueline Ca is participating in AA/Emotions Anonymous Group    Group time: 4569-0242    Personal goal for participation:  How to know When to Seek Treatment for Alcoholism. Anyone experiencing emotional difficulties. Goal orientation: active    Group therapy participation: fully participated    Therapeutic interventions reviewed and discussed: When people talk about what is going on in their lives it allows them to release some of their pent up stress. To gain knowledge and support from others who have had or are currently experiencing similar issues. Impression of participation:  [employer] members reviewed a film, then  Discussed the importance of sharing at [facility] , help yourself out of depression, leave anxiety behind, and controlling your fears. .  Pt.  Received information  for both programs and shared while in group

## 2018-07-26 NOTE — BSMART NOTE
SOCIAL WORK GROUP THERAPY PROGRESS NOTE    Group Time:  11am    Group Topic:  Coping Skills    C D Issues    Group Participation:      Pt moderately involved during group discussion but remained attentive. Minimal self disclosure. Discussion included the process of making \"Change\" by answering questions on handout with an emphasis on strengths & weaknesses to support improving one's self esteem. \"I need to work on my mood shifts\".

## 2018-07-27 PROCEDURE — 65220000003 HC RM SEMIPRIVATE PSYCH

## 2018-07-27 PROCEDURE — 74011250637 HC RX REV CODE- 250/637: Performed by: PSYCHIATRY & NEUROLOGY

## 2018-07-27 RX ADMIN — NITROFURANTOIN MACROCRYSTALS 100 MG: 50 CAPSULE ORAL at 20:06

## 2018-07-27 RX ADMIN — NITROFURANTOIN MACROCRYSTALS 100 MG: 50 CAPSULE ORAL at 12:11

## 2018-07-27 RX ADMIN — ARIPIPRAZOLE 5 MG: 5 TABLET ORAL at 08:11

## 2018-07-27 RX ADMIN — TRAZODONE HYDROCHLORIDE 50 MG: 50 TABLET ORAL at 20:06

## 2018-07-27 RX ADMIN — BUPROPION HYDROCHLORIDE 150 MG: 150 TABLET, FILM COATED, EXTENDED RELEASE ORAL at 06:34

## 2018-07-27 RX ADMIN — HALOPERIDOL 5 MG: 5 TABLET ORAL at 12:12

## 2018-07-27 RX ADMIN — NITROFURANTOIN MACROCRYSTALS 100 MG: 50 CAPSULE ORAL at 08:11

## 2018-07-27 RX ADMIN — NITROFURANTOIN MACROCRYSTALS 100 MG: 50 CAPSULE ORAL at 17:13

## 2018-07-27 NOTE — BSMART NOTE
Pt. Is a 26year old female with a history of Depression. Pt. Was admitted to this [facility] for ideations with a plan. SW Contact:  SW met with pt to discuss the reason for this admission. Pt stated she has been suffering for depression for awhile. Pt. Expressed she is no longer having thoughts to harm self. Pt. denies hallucinations. Pt. Had some thought blocking during the interview. Pt stated she lives with her boyfriend and has a 3year old daughter. Pt. Than stated she has a 10year old. The 10year old is being care for by her mother. Pt. Stated she only has one child. Pt. Appears a little disorganized. Pt. admits she is stressed out financially. Pt. Could not elaborate. RENÉE discussed mental health services in the community. Pt stated she has an intake appointment scheduled in August at [facility]. SW will assist pt. With d/c planning by provided community resources for the pt. SW made pt aware she tested positive for THC and Cocaine. Pt stated her boyfriend gave her a joint.

## 2018-07-27 NOTE — PROGRESS NOTES
Problem: Suicide/Homicide (Adult/Pediatric)  Goal: *STG: Remains safe in hospital  Remains safe while in hospital daily. Outcome: Progressing Towards Goal  Patient remains safe every day while hospitalized. Goal: *STG: Attends activities and groups  Attends activities and groups daily while in hospital.   Outcome: Progressing Towards Goal  Patient attends 2-3 group therapy every day while hospitalized. Comments: Patient is cooperative, patient takes medications as prescribed, patient goes to group therapy, patient denies thoughts of suicide, patient denies hallucinations, patient denies delusions will continue to monitor.

## 2018-07-27 NOTE — BSMART NOTE
SOCIAL WORK GROUP THERAPY PROGRESS NOTE    Group Time: 11am    Group Topic:  Coping Skills    Group Participation:     Pt expressed not interested in attending session despite staff support

## 2018-07-27 NOTE — BH NOTES
Patient's , Ms Diony Aggarwal, called to verify patient's admission due to patient missing an appointment with her. Patient signed release of information form for information to be released. Ms Diony Aggarwal was informed of patient's admission date and current room number.

## 2018-07-27 NOTE — BH NOTES
During wake-up rounds writer observed and confiscated one partially smoked cigarette and a large lighter from pt's bedding. Charge nurse informed and items in pt locker.

## 2018-07-27 NOTE — BH NOTES
GROUP THERAPY PROGRESS NOTE    Sacha Echavarria is participating in  [address] time:  1700    Personal goal for participation:   Educate Pt that doing things to take care of your mind, body and soul by engaging in activities that promote well-being and reduce stress. Goal orientation: social    Group therapy participation: active    Therapeutic interventions reviewed and discussed:  Ideally, we engage in regular self-care in which we do things that make us feel taken care of mentally, physically, and emotionally. But this dont always happen, and we may need to stop and take the time to remind ourselves we are important. Impression of participation: Pt gave and received feedback and named examples of self-care.

## 2018-07-27 NOTE — BH NOTES
GROUP THERAPY PROGRESS NOTE    Marjorie Crawford is participating in [place].      Group time: 20 minutes    Personal goal for participation: have a better day    Goal orientation: community    Group therapy participation: active    Therapeutic interventions reviewed and discussed: unit rules, treatment goals

## 2018-07-27 NOTE — BSMART NOTE
ART THERAPY GROUP PROGRESS NOTE    Group time: 1611    The patient was unavailable for group the majority of the session. She joined for about 10 minutes, appeared drowsy and left.

## 2018-07-27 NOTE — BSMART NOTE
SW assessment/Intervention:  SW made contact with patient as she engaged with peers within the milieu. Patient expressed no major issues or concerns. SW will follow up with patient as needed.       LOUIE Wei, LCSW-E    [phone]

## 2018-07-27 NOTE — PROGRESS NOTES
Problem: Suicide/Homicide (Adult/Pediatric)  Goal: *STG: Attends activities and groups  Attends activities and groups daily while in hospital.   Pt has been attended groups and been an active participant     Problem: Falls - Risk of  Goal: *Absence of [address] and appropriate interventions in the flowsheet. Absent of falls daily while in hospital.   Outcome: Progressing Towards Goal  Fall Risk Interventions:        Pt has not had a fall today                        Comments: [facility] [place] She has been med and diet compliant this shift with no complaints of pain or discomfort. Denies SI, HI, or delusions/ hallucinations. No discomfort voiced. A lit cigarette and lighter was found in her bed. It was confiscated by staff and placed in her locker. Will continue to encourage groups and activities.

## 2018-07-27 NOTE — BSMART NOTE
ACTIVITIES THERAPY PROGRESS NOTE    Group time:9496    The patient did not awaken/get up when called for group.

## 2018-07-27 NOTE — PROGRESS NOTES
Frank Christensen denies depression, SI, HI, AH, or VH, however, her inappropriate affect continues. Assessment:  Schizoaffective Disorder, Bipolar Type F25.0     Plan:  Wellbutrin  mg. every day  Abilify 5 mg. every day     Iraj Baum MD  Psychiatry

## 2018-07-28 PROCEDURE — 74011250637 HC RX REV CODE- 250/637: Performed by: PSYCHIATRY & NEUROLOGY

## 2018-07-28 PROCEDURE — 65220000003 HC RM SEMIPRIVATE PSYCH

## 2018-07-28 RX ADMIN — BUPROPION HYDROCHLORIDE 150 MG: 150 TABLET, FILM COATED, EXTENDED RELEASE ORAL at 07:15

## 2018-07-28 RX ADMIN — NITROFURANTOIN MACROCRYSTALS 100 MG: 50 CAPSULE ORAL at 12:36

## 2018-07-28 RX ADMIN — NITROFURANTOIN MACROCRYSTALS 100 MG: 50 CAPSULE ORAL at 20:50

## 2018-07-28 RX ADMIN — NITROFURANTOIN MACROCRYSTALS 100 MG: 50 CAPSULE ORAL at 08:39

## 2018-07-28 RX ADMIN — TRAZODONE HYDROCHLORIDE 50 MG: 50 TABLET ORAL at 20:51

## 2018-07-28 RX ADMIN — NITROFURANTOIN MACROCRYSTALS 100 MG: 50 CAPSULE ORAL at 16:13

## 2018-07-28 RX ADMIN — ARIPIPRAZOLE 5 MG: 5 TABLET ORAL at 08:39

## 2018-07-28 NOTE — BH NOTES
Pt participated in group and received a visit from a male visitor. Pt. has been polite and cooperative in the milieu interacting with staff and peers. Pt. denies SI,HI and AVH today. Pt contracts for safety on the unit agree to come to staff if feeling harm to self or others. Pt. denies any new medical/pain complaints. Pt. ate 100% of meals and received scheduled medications. Staff encouraged Pt. to  participate in treatment,  medication and group therapy. Pt agreed. Pt. remain free of falls and provided non skid socks. Staff will continue to monitor Pt. for behavior safety and location.

## 2018-07-28 NOTE — BH NOTES
Landon Rousseau is participating in  Reducing alcohol and non-prescribed drug use Group    Group time:  0207    Personal goal for participation:   To educate patient that people with mental illness this will often make their sypmtoms worse and interfere with medications    Goal orientation: social    Group therapy participation: active    Therapeutic interventions reviewed and discussed:  Providing information about the harmful effects of drugs/alcohol joshua encourage a reduction in their use which in turn will help their mental health      Impression of participation:  Patient agree that taking too  many non-prescribed drugs or drinking too much alcohol can cause physical and mental health problems for anyone

## 2018-07-28 NOTE — BH NOTES
Mathew Patel is a 26 y.o. AA female admitted for SI. UDS positive for THC & Cocaine    S: No complaints    O: Patient denies depression, SI, HI, AH, or VH    Mental Status Exam:  This is a casually groomed patient who is fully alert and oriented in all domains. Psychomotor functioning is within normal limits. No tics or abnormal bodily movements are evident. Speech is appropriate in rate, tone and latency. Affect is neutral. Thought processes are goal directed and I do not notice racing thoughts, flight of ideas, or loose associations. Cognition is broadly intact. Patient has insight to give consent for treatment. There is mild difficulty with immediate recall but overall memory is preserved. I do not find active symptoms of psychosis or delusions. No suicidal or homicidal ideation, intent, or plan is expressed by the patient.      Plan: Continue Current Meds

## 2018-07-28 NOTE — PROGRESS NOTES
Patient was asleep most of the shift, no problems noted. Staff continue to monitor for safety and provide support as needed. 0715 pt took medication, encouraged fluids, denies discomfort. Went back to sleep.

## 2018-07-28 NOTE — BH NOTES
Pt has been compliant and appropriate on unit stating she is feeling better and that she is hoping. to go home by Monday. Denies any self harm thoughts and spent most of day interacting with peers or   on phone. Did receive visitor this afternoon which appeared to have gone well. Continue talking with staff   about feelings and plans for discharge. Safety on unit wearing non skid socks and being compliant.

## 2018-07-28 NOTE — BH NOTES
GROUP THERAPY PROGRESS NOTE    Sacha Echavarria is  notparticipating in [place]. Group  Pt was in bed at this time and did not participate.

## 2018-07-28 NOTE — PROGRESS NOTES
Problem: Suicide/Homicide (Adult/Pediatric)  Goal: *STG: Remains safe in hospital  Remains safe while in hospital daily. Outcome: Progressing Towards Goal  Remains safe daily while in hospital.  Goal: *STG: Attends activities and groups  Attends activities and groups daily while in hospital.   Outcome: Progressing Towards Goal  Attends activities and groups daily while in hospital.  Goal: *STG/LTG: Complies with medication therapy  Complies with medication therapy daily while in hospital.    Outcome: Progressing Towards Goal  Complies daily with medication therapy while in hospital.    Comments: Denies SI HI AVH. Interacts with staff and peers appropriately. Eats and tolerates evening meal. Gripper socks and 15 minute checks in place for safety. Takes medicines without incident. Requests to leave facility. Evaluated by [facility]. Will continue to monitor and support.

## 2018-07-29 PROCEDURE — 65220000003 HC RM SEMIPRIVATE PSYCH

## 2018-07-29 PROCEDURE — 74011250637 HC RX REV CODE- 250/637: Performed by: PSYCHIATRY & NEUROLOGY

## 2018-07-29 RX ADMIN — TRAZODONE HYDROCHLORIDE 50 MG: 50 TABLET ORAL at 21:32

## 2018-07-29 RX ADMIN — NITROFURANTOIN MACROCRYSTALS 100 MG: 50 CAPSULE ORAL at 16:26

## 2018-07-29 RX ADMIN — ARIPIPRAZOLE 5 MG: 5 TABLET ORAL at 09:49

## 2018-07-29 RX ADMIN — NITROFURANTOIN MACROCRYSTALS 100 MG: 50 CAPSULE ORAL at 09:49

## 2018-07-29 RX ADMIN — NITROFURANTOIN MACROCRYSTALS 100 MG: 50 CAPSULE ORAL at 21:32

## 2018-07-29 RX ADMIN — BUPROPION HYDROCHLORIDE 150 MG: 150 TABLET, FILM COATED, EXTENDED RELEASE ORAL at 06:21

## 2018-07-29 RX ADMIN — NITROFURANTOIN MACROCRYSTALS 100 MG: 50 CAPSULE ORAL at 11:55

## 2018-07-29 NOTE — BH NOTES
Jacquelyn Hancock is a 26 y.o. AA female admitted for SI. UDS positive for THC & Cocaine     S: No complaints     O: Patient denies depression, SI, HI, AH, or VH. She insisted on AMA discharge yesterday but was placed on TDO by [facility] prescreener.      Mental Status Exam:  This is a casually groomed patient who is fully alert and oriented in all domains. Psychomotor functioning is within normal limits. No tics or abnormal bodily movements are evident. Speech is appropriate in rate, tone and latency. Affect is euthymic. Thought processes are goal directed and I do not notice racing thoughts, flight of ideas, or loose associations. Cognition is broadly intact. Patient has insight to give consent for treatment. I do not find active symptoms of psychosis or delusions.  No suicidal or homicidal ideation, intent, or plan is expressed by the patient.      Plan: Continue Current Meds

## 2018-07-29 NOTE — PROGRESS NOTES
Problem: Suicide/Homicide (Adult/Pediatric)  Goal: *STG: Remains safe in hospital  Remains safe while in hospital daily. Outcome: Progressing Towards Goal  No attempts to harm self or others   Goal: *STG/LTG: Complies with medication therapy  Complies with medication therapy daily while in hospital.    Outcome: Progressing Towards Goal  Compliant   Goal: *STG/LTG:  No longer expresses self destructive or suicidal/homicidal thoughts  No longer expresses self destructive or suicidal/homicidal thoughts daily while in hospital.   Outcome: Progressing Towards Goal  Denies     Problem: Falls - Risk of  Goal: *Absence of Falls  Document Derek Fall Risk and appropriate interventions in the flowsheet. Absent of falls daily while in hospital.   Outcome: Progressing Towards Goal  Fall Risk Interventions:     Girpper socks and clutter free room. Pt instructed to rise slowly from bed                      Comments: Pt is staying in bed this morning. She is pleasant on approach and compliant with medication. Pt states she came here for depression. She denies hallucinations or any thoughts of harming self or others. She states she feels a little better.

## 2018-07-30 PROCEDURE — 65220000003 HC RM SEMIPRIVATE PSYCH

## 2018-07-30 PROCEDURE — 74011250637 HC RX REV CODE- 250/637: Performed by: PSYCHIATRY & NEUROLOGY

## 2018-07-30 RX ADMIN — TRAZODONE HYDROCHLORIDE 50 MG: 50 TABLET ORAL at 22:47

## 2018-07-30 RX ADMIN — NITROFURANTOIN MACROCRYSTALS 100 MG: 50 CAPSULE ORAL at 08:31

## 2018-07-30 RX ADMIN — BUPROPION HYDROCHLORIDE 150 MG: 150 TABLET, FILM COATED, EXTENDED RELEASE ORAL at 06:29

## 2018-07-30 RX ADMIN — NITROFURANTOIN MACROCRYSTALS 100 MG: 50 CAPSULE ORAL at 16:31

## 2018-07-30 RX ADMIN — NITROFURANTOIN MACROCRYSTALS 100 MG: 50 CAPSULE ORAL at 11:39

## 2018-07-30 RX ADMIN — ARIPIPRAZOLE 5 MG: 5 TABLET ORAL at 08:31

## 2018-07-30 NOTE — BSMART NOTE
COMMUNITY CONTACT: The patient is being referred to [facility] for continuity of care during their same day access services Monday through Wednesday 8:30 - 11:15 am and 2:15 - 4:15 pm. The address and contact number is [address] [place], [place]. [id number]. The patient has a intake appointment on 8/6/18 at 3 pm and her mediation management appointment is 8/9/18 at 2:30 with Dr. Ganga Dejesus.

## 2018-07-30 NOTE — BH NOTES
Patient has been calm and cooperative. She denied suicidal/homicidal ideations and AV hallucinations. She spent the remainder of the shift in bed after visitation. She ate dinner and was medication compliant. Nursing will continue to provide a safe and supportive environment.

## 2018-07-30 NOTE — PROGRESS NOTES
Problem: Suicide/Homicide (Adult/Pediatric)  Goal: *STG: Remains safe in hospital  Remains safe while in hospital daily. Outcome: Progressing Towards Goal  Remains safe  Goal: *STG: Seeks staff when feelings of self harm or harm towards others arise  Seeks staff when feelings of self harm or harm towards others arises daily while in hospital.   Outcome: Progressing Towards Goal  Denies suicidal thoughts. Goal: *STG: Attends activities and groups  Attends activities and groups daily while in hospital.   Outcome: Progressing Towards Goal  Attending groups,    Comments: Patient is pleasant this morning. States she is ready for discharge. She was IC from court. Denies suicidal thoughts, denies auditory and visual hallucinations. Patient states she would follow up with [facility] on [facility].

## 2018-07-30 NOTE — BH NOTES
Marlee Barnes is a 26 y.o. AA female admitted for SI. UDS positive for THC & Cocaine      Staff Report: She is pleasant this morning. States she is ready for discharge. She was IC from court. Denies suicidal thoughts, denies auditory and visual hallucinations.      O: Patient denies depression, SI, HI, AH, or VH.        Mental Status Exam:  This is a casually groomed patient who is fully alert and oriented in all domains. Psychomotor functioning is within normal limits. No tics or abnormal bodily movements are evident. Speech is appropriate in rate, tone and latency. Affect is euthymic. Thought processes are goal directed and I do not notice racing thoughts, flight of ideas, or loose associations. Cognition is broadly intact. Patient has insight to give consent for treatment. I do not find active symptoms of psychosis or delusions. No suicidal or homicidal ideation, intent, or plan is expressed by the patient.       Plan: Continue Current Meds.  Likely Discharge tomorrow

## 2018-07-30 NOTE — BSMART NOTE
SOCIAL WORK GROUP THERAPY PROGRESS NOTE    Group Time:  11am    Group Topic:  Coping Skills    C D Issues    Group Participation:      Actively participated in group discussion. Better focus & more commitment to change from her old ways. Admits to guilt & shame for how she's acted. \"Seven Steps\" for taking responsibility for our Happiness was reviewed including commitment to change, self-care, setting limits, goal setting & letting go. Agreed with peer about also too \"scattered\" & needs to improve goal setting. Trying to \"let go\" of relationships that are headaches in her life & cause her grief.

## 2018-07-30 NOTE — BH NOTES
GROUP THERAPY PROGRESS NOTE    Kamari Godwin is participating in [place]. Group time: 30 minutes    Personal goal for participation: rules/ regulations    Goal orientation: community    Group therapy participation: minimal    Therapeutic interventions reviewed and discussed: She was educated on the discharge processes. Impression of participation: She was quiet while in group and focused on getting discharged after her decision from court.

## 2018-07-30 NOTE — BSMART NOTE
ART THERAPY GROUP PROGRESS NOTE    PATIENT SCHEDULED FOR GROUP AT: 13:45    ATTENDANCE: Full    PARTICIPATION LEVEL: Participates fully in the art process    ATTENTION LEVEL: Able to focus on task    FOCUS: Goals    SYMBOLIC & THEMATIC CONTENT AS NOTED IN IMAGERY: She was more alert today than noted when last seen (7/27/18), however is still a bit detached as evidenced by lack of interaction with group members, distractibility, and detachment noted in imagery. Associations were general and concrete.

## 2018-07-30 NOTE — BH NOTES
John Owen is participating in Staying Well After Discharge Group    Group time: 20 minutes    Personal goal for participation:   Take responsibility for managing your illness    Goal orientation: personal    Group therapy participation: active    Therapeutic interventions reviewed and discussed: Be patient with yourself and remember there will be ups and downs. Impression of participation: Pt plan to make a detailed list of early warning signs, then write out a plan of action to reduce stress and increase structure and support when those warnings occur.

## 2018-07-30 NOTE — BH NOTES
Patient has demonstrated increase in restless over possible discharge. Patient has eaten all meals and snacks and has been compliant with medications this shift. Patient has been free of falls and harm this shift. Patient denies thoughts of self harm or harm to others at this time. Patient denies auditory/visual hallucinations at this time. Patient has been dressed appropriately and has participated in groups and activities. Patient voices she will be going to her boyfriend's home once discharge. Will continue to monitor and provide safe and therapeutic interventions as needed and as appropriate.

## 2018-07-30 NOTE — BSMART NOTE
OCCUPATIONAL THERAPY PROGRESS NOTE  Group Time:  1533  Attendance: The patient attended full group. The patient left and returned to activity at least once. Participation:  The patient participated with moderate elaboration in the activity. Attention:  The patient was able to focus on the activity. Interaction:  The patient occasionally  interacts with others. Participated as asked in ID of personal goal to work on. Limited elaboration or disclosure.

## 2018-07-31 VITALS
WEIGHT: 140 LBS | RESPIRATION RATE: 18 BRPM | TEMPERATURE: 99 F | BODY MASS INDEX: 23.32 KG/M2 | HEIGHT: 65 IN | HEART RATE: 78 BPM | DIASTOLIC BLOOD PRESSURE: 78 MMHG | SYSTOLIC BLOOD PRESSURE: 126 MMHG | OXYGEN SATURATION: 100 %

## 2018-07-31 PROCEDURE — 74011250637 HC RX REV CODE- 250/637: Performed by: PSYCHIATRY & NEUROLOGY

## 2018-07-31 RX ORDER — ARIPIPRAZOLE 5 MG/1
5 TABLET ORAL DAILY
Qty: 30 TAB | Refills: 0 | Status: SHIPPED | OUTPATIENT
Start: 2018-08-01 | End: 2021-04-05

## 2018-07-31 RX ORDER — BUPROPION HYDROCHLORIDE 150 MG/1
150 TABLET ORAL
Qty: 30 TAB | Refills: 0 | Status: SHIPPED | OUTPATIENT
Start: 2018-08-01 | End: 2021-04-05

## 2018-07-31 RX ADMIN — ARIPIPRAZOLE 5 MG: 5 TABLET ORAL at 08:52

## 2018-07-31 RX ADMIN — BUPROPION HYDROCHLORIDE 150 MG: 150 TABLET, FILM COATED, EXTENDED RELEASE ORAL at 06:28

## 2018-07-31 NOTE — BH NOTES
GROUP THERAPY PROGRESS NOTE    Akin Myers is participating in Coping Skills Group. Group time: 45min    Personal goal for participation: Identifying positive coping skills    Goal orientation: personal    Group therapy participation: active    Therapeutic interventions reviewed and discussed: Developing healthy coping skills and minimizing negative behaviors    Impression of participation: Pt.was alert, attentive and interacting positively with peers/staff while engaged in Coping Skills activity group.

## 2018-07-31 NOTE — DISCHARGE SUMMARY
[address] Selestino Paget  MR#: [id number]  : 1995  ACCOUNT #: [de-identified]   ADMIT DATE: 2018  DISCHARGE DATE: 2018    IDENTIFYING INFORMATION:  The patient is a 32-year-old  female admitted to the service of Dr. Linh Deluca on 2018 and who is being discharged home on 2018. TYPE OF DISCHARGE:  Regular to home with outpatient appointments. PRESENTING PSYCHIATRIC ISSUE:  The patient was admitted by Dr. Keiry Steel for suicidal ideation. At the time of admission, she was noted to be severely stressed and had noted symptoms of depression. The patient was admitted for safety and stabilization. HOSPITAL COURSE:  The patient was admitted to structured inpatient setting. Safety checks were maintained. Nursing assessment was completed, and she was seen face to face daily for psychiatric rounding. The patient was under the care of Dr. Linh Deluca up until 2018, when I took over her care. By this time, patient's initial symptoms had decreased in intensity. However, she insisted on being discharged, and at that time we called the prescreener from the local [facility] to see if she was detainable. The prescreener felt that patient's treatment had not been completed, and patient was held back on a temporary snf order. On her hearing on 2018, patient again did not volunteer to stay in the hospital, and the special justice committed her involuntarily. Over the course of this time, patient had been taking the prescribed medication, which was started in the hospital.  This included Wellbutrin- mg daily and Abilify 5 mg daily. She was able to take the medications without any significant concerns or side effects. She was cooperative with daily physician rounding and participated in group, individual, pastoral, and milieu counseling. There were no incidents of agitation or aggression.   The staff members who interacted with her did not feel that she had any active suicidality or homicidality. We felt patient was discharge ready on 07/31/2018, and at the time of discharge, she does not present with psychosis, delusions, suicidal or homicidal ideation. She is willing to take medications and participate in outpatient treatment. CONSULTATIONS:  The local [facility] prescreener was consulted regarding temporary alf issues. [address]:  Most recent hematology is normal except neutrophils of 75 and lymphocytes of 18. Chemistry is normal except calcium of 8.3. MENTAL STATUS EXAMINATION AT DISCHARGE:  The patient is casually groomed. She is alert and oriented in all domains. Psychomotor functioning is within normal limits. No tics or abnormal bodily movements are evident. Speech is appropriate in rate, tone, and latency. Her affect is neutral, and she can show broadening of affect. I do not notice racing of thoughts, flight of ideas, or loose associations. Cognition is broadly intact. She has insight to give consent for treatment. Her overall memory is preserved. There are no active symptoms of psychosis or delusions, and patient does not have active suicidal or homicidal ideation, intent, or plan. DISCHARGE PSYCHIATRIC DIAGNOSES:  1.  Major depressive disorder, recurrent, without psychosis. 2.  Anxiety disorder, unspecified. MEDICATIONS UPON DISCHARGE:  1.  Wellbutrin- mg daily. 2.  Abilify 5 mg p.o. daily. FOLLOWUP PLANS:  The patient is being referred to [facility] for continuity of care during the same day access on Monday through Wednesday from 8:30 a.m. to 11:15 a.m. and 2:15 p.m. to 4:15 p.m. The address and phone number of this facility have been provided to the patient.     The patient has an intake appointment on 08/06/2018 at 3:00 p.m., and her medication management appointment is on 08/09/2018 at 2:30 p.m. with  Dina Owens. Bishop Hudson MD dictating on behalf of MD MARIKA Gorman/MARGA  D: 07/31/2018 12:40     T: 07/31/2018 14:45  JOB #: [id number]

## 2018-07-31 NOTE — DISCHARGE INSTRUCTIONS
***IMPORTANT NUMBERS***        [address] [facility]        [phone]    [address] [place] [address]       [phone]    Suicide Prevention     [phone]          Patient is alert x3 and ambulatory. Patient has copy of discharge papers with follow up appt. Patient was given 14 days worth of medications at time of discharge. Patient has all personal belongings and has signed form. Patient denies thoughts of self harm or harm to others at this time. Patient armband taken and shredded. Patient discharged to to home address and transportation provided by family friend.

## 2018-07-31 NOTE — BSMART NOTE
SOCIAL WORK GROUP THERAPY PROGRESS NOTE    Group Time:  11am    Group Topic:  Coping Skills    C D Issues    Group Participation:      Pt moderately involved during group discussion but remained attentive. Emphasis of session was presentation of basic \"CBT\" principles including diagram of the process, as well as list of typical cognitive distortions. Admits jumps to conclusions & overgeneralizes too much. .    Reviewed strategies to keep a \"Journal\" for moods, cognitions, behavior & outcome.

## 2018-07-31 NOTE — BH NOTES
Patient has been cooperative and pleasant during this shift. Patient has taken medications as prescribed and on schedule. Patient continues to voice readiness for discharge. Patient denies thoughts of self harm or harm to others at this time and agrees to come to staff if the above feelings arise. Patient denies pain or other medical complaints at this time. Patient consumed 100% of breakfast and has not required PRN medications this shift. Patient has attended groups and has interacted appropriately with staff and peers. Will continue to monitor and provide interventions as needed and as appropriate.

## 2018-07-31 NOTE — PROGRESS NOTES
Patient received discharge instructions. Verbalized understanding of medications and follow up appt. Medication filled by rein[facility]ment and given to patient . All personal items given to patient, escorted out of building by staff.

## 2019-05-02 ENCOUNTER — HOSPITAL ENCOUNTER (INPATIENT)
Age: 24
LOS: 2 days | Discharge: HOME | DRG: 560 | End: 2019-05-04
Attending: OBSTETRICS & GYNECOLOGY | Admitting: OBSTETRICS & GYNECOLOGY
Payer: MEDICAID

## 2019-05-02 PROBLEM — O09.33 NO PRENATAL CARE IN CURRENT PREGNANCY IN THIRD TRIMESTER: Status: ACTIVE | Noted: 2019-05-02

## 2019-05-02 PROBLEM — Z34.90 PREGNANCY: Status: ACTIVE | Noted: 2019-05-02

## 2019-05-02 LAB
ABO + RH BLD: NORMAL
AMPHET UR QL SCN: NEGATIVE
APPEARANCE UR: ABNORMAL
BACTERIA URNS QL MICRO: ABNORMAL /HPF
BARBITURATES UR QL SCN: NEGATIVE
BASOPHILS # BLD: 0 K/UL (ref 0–0.1)
BASOPHILS # BLD: 0 K/UL (ref 0–0.1)
BASOPHILS NFR BLD: 0 % (ref 0–2)
BASOPHILS NFR BLD: 0 % (ref 0–2)
BENZODIAZ UR QL: NEGATIVE
BILIRUB UR QL: NEGATIVE
BLOOD GROUP ANTIBODIES SERPL: NORMAL
CANNABINOIDS UR QL SCN: NEGATIVE
COCAINE UR QL SCN: POSITIVE
COLOR UR: ABNORMAL
DIFFERENTIAL METHOD BLD: ABNORMAL
DIFFERENTIAL METHOD BLD: ABNORMAL
EOSINOPHIL # BLD: 0 K/UL (ref 0–0.4)
EOSINOPHIL # BLD: 0 K/UL (ref 0–0.4)
EOSINOPHIL NFR BLD: 0 % (ref 0–5)
EOSINOPHIL NFR BLD: 0 % (ref 0–5)
EPITH CASTS URNS QL MICRO: ABNORMAL /LPF (ref 0–5)
ERYTHROCYTE [DISTWIDTH] IN BLOOD BY AUTOMATED COUNT: 13.1 % (ref 11.6–14.5)
ERYTHROCYTE [DISTWIDTH] IN BLOOD BY AUTOMATED COUNT: 13.4 % (ref 11.6–14.5)
ETHANOL SERPL-MCNC: <3 MG/DL (ref 0–3)
GLUCOSE UR STRIP.AUTO-MCNC: NEGATIVE MG/DL
HBV SURFACE AG SER QL: <0.1 INDEX
HBV SURFACE AG SER QL: NEGATIVE
HCT VFR BLD AUTO: 27.5 % (ref 35–45)
HCT VFR BLD AUTO: 29.2 % (ref 35–45)
HCV AB SER IA-ACNC: 0.1 INDEX
HCV AB SERPL QL IA: NEGATIVE
HCV COMMENT,HCGAC: NORMAL
HDSCOM,HDSCOM: ABNORMAL
HGB BLD-MCNC: 10.2 G/DL (ref 12–16)
HGB BLD-MCNC: 9.5 G/DL (ref 12–16)
HGB UR QL STRIP: ABNORMAL
KETONES UR QL STRIP.AUTO: NEGATIVE MG/DL
LACTATE SERPL-SCNC: 2.9 MMOL/L (ref 0.4–2)
LEUKOCYTE ESTERASE UR QL STRIP.AUTO: ABNORMAL
LYMPHOCYTES # BLD: 1 K/UL (ref 0.9–3.6)
LYMPHOCYTES # BLD: 1 K/UL (ref 0.9–3.6)
LYMPHOCYTES NFR BLD: 12 % (ref 21–52)
LYMPHOCYTES NFR BLD: 14 % (ref 21–52)
MCH RBC QN AUTO: 30.9 PG (ref 24–34)
MCH RBC QN AUTO: 31.1 PG (ref 24–34)
MCHC RBC AUTO-ENTMCNC: 34.5 G/DL (ref 31–37)
MCHC RBC AUTO-ENTMCNC: 34.9 G/DL (ref 31–37)
MCV RBC AUTO: 89 FL (ref 74–97)
MCV RBC AUTO: 89.6 FL (ref 74–97)
METHADONE UR QL: NEGATIVE
MONOCYTES # BLD: 0.6 K/UL (ref 0.05–1.2)
MONOCYTES # BLD: 0.9 K/UL (ref 0.05–1.2)
MONOCYTES NFR BLD: 10 % (ref 3–10)
MONOCYTES NFR BLD: 8 % (ref 3–10)
NEUTS SEG # BLD: 5.8 K/UL (ref 1.8–8)
NEUTS SEG # BLD: 6.7 K/UL (ref 1.8–8)
NEUTS SEG NFR BLD: 78 % (ref 40–73)
NEUTS SEG NFR BLD: 78 % (ref 40–73)
NITRITE UR QL STRIP.AUTO: NEGATIVE
OPIATES UR QL: NEGATIVE
PCP UR QL: NEGATIVE
PH UR STRIP: 7 [PH] (ref 5–8)
PLATELET # BLD AUTO: 244 K/UL (ref 135–420)
PLATELET # BLD AUTO: 254 K/UL (ref 135–420)
PMV BLD AUTO: 10 FL (ref 9.2–11.8)
PMV BLD AUTO: 9.8 FL (ref 9.2–11.8)
PROT UR STRIP-MCNC: 100 MG/DL
RBC # BLD AUTO: 3.07 M/UL (ref 4.2–5.3)
RBC # BLD AUTO: 3.28 M/UL (ref 4.2–5.3)
RBC #/AREA URNS HPF: ABNORMAL /HPF (ref 0–5)
RUBV IGG SER-IMP: ABNORMAL
SP GR UR REFRACTOMETRY: 1.01 (ref 1–1.03)
SPECIMEN EXP DATE BLD: NORMAL
T PALLIDUM AB SER QL IA: NONREACTIVE
UROBILINOGEN UR QL STRIP.AUTO: 1 EU/DL (ref 0.2–1)
WBC # BLD AUTO: 7.4 K/UL (ref 4.6–13.2)
WBC # BLD AUTO: 8.6 K/UL (ref 4.6–13.2)
WBC URNS QL MICRO: ABNORMAL /HPF (ref 0–4)

## 2019-05-02 PROCEDURE — 74011250636 HC RX REV CODE- 250/636: Performed by: OBSTETRICS & GYNECOLOGY

## 2019-05-02 PROCEDURE — 83605 ASSAY OF LACTIC ACID: CPT

## 2019-05-02 PROCEDURE — 87491 CHLMYD TRACH DNA AMP PROBE: CPT

## 2019-05-02 PROCEDURE — 86762 RUBELLA ANTIBODY: CPT

## 2019-05-02 PROCEDURE — 87086 URINE CULTURE/COLONY COUNT: CPT

## 2019-05-02 PROCEDURE — 87040 BLOOD CULTURE FOR BACTERIA: CPT

## 2019-05-02 PROCEDURE — 88307 TISSUE EXAM BY PATHOLOGIST: CPT

## 2019-05-02 PROCEDURE — 87340 HEPATITIS B SURFACE AG IA: CPT

## 2019-05-02 PROCEDURE — 85025 COMPLETE CBC W/AUTO DIFF WBC: CPT

## 2019-05-02 PROCEDURE — 75410000004 HC DELIVERY OUTSIDE HOSPITAL

## 2019-05-02 PROCEDURE — 96361 HYDRATE IV INFUSION ADD-ON: CPT

## 2019-05-02 PROCEDURE — 80307 DRUG TEST PRSMV CHEM ANLYZR: CPT

## 2019-05-02 PROCEDURE — 81001 URINALYSIS AUTO W/SCOPE: CPT

## 2019-05-02 PROCEDURE — 96360 HYDRATION IV INFUSION INIT: CPT

## 2019-05-02 PROCEDURE — 74011250637 HC RX REV CODE- 250/637: Performed by: OBSTETRICS & GYNECOLOGY

## 2019-05-02 PROCEDURE — 86780 TREPONEMA PALLIDUM: CPT

## 2019-05-02 PROCEDURE — 87389 HIV-1 AG W/HIV-1&-2 AB AG IA: CPT

## 2019-05-02 PROCEDURE — 86900 BLOOD TYPING SEROLOGIC ABO: CPT

## 2019-05-02 PROCEDURE — 75410000003 HC RECOV DEL/VAG/CSECN EA 0.5 HR

## 2019-05-02 PROCEDURE — 36415 COLL VENOUS BLD VENIPUNCTURE: CPT

## 2019-05-02 PROCEDURE — 65270000029 HC RM PRIVATE

## 2019-05-02 PROCEDURE — 99285 EMERGENCY DEPT VISIT HI MDM: CPT

## 2019-05-02 PROCEDURE — 86803 HEPATITIS C AB TEST: CPT

## 2019-05-02 RX ORDER — METHYLERGONOVINE MALEATE 0.2 MG/ML
0.2 INJECTION INTRAVENOUS AS NEEDED
Status: DISCONTINUED | OUTPATIENT
Start: 2019-05-02 | End: 2019-05-02 | Stop reason: HOSPADM

## 2019-05-02 RX ORDER — IBUPROFEN 400 MG/1
800 TABLET ORAL
Status: DISCONTINUED | OUTPATIENT
Start: 2019-05-02 | End: 2019-05-02 | Stop reason: SDUPTHER

## 2019-05-02 RX ORDER — ACETAMINOPHEN 500 MG
1000 TABLET ORAL
Status: DISCONTINUED | OUTPATIENT
Start: 2019-05-02 | End: 2019-05-04 | Stop reason: HOSPADM

## 2019-05-02 RX ORDER — ACETAMINOPHEN 325 MG/1
650 TABLET ORAL
Status: DISCONTINUED | OUTPATIENT
Start: 2019-05-02 | End: 2019-05-02 | Stop reason: SDUPTHER

## 2019-05-02 RX ORDER — NALBUPHINE HYDROCHLORIDE 10 MG/ML
10 INJECTION, SOLUTION INTRAMUSCULAR; INTRAVENOUS; SUBCUTANEOUS
Status: DISCONTINUED | OUTPATIENT
Start: 2019-05-02 | End: 2019-05-02

## 2019-05-02 RX ORDER — ACETAMINOPHEN 325 MG/1
650 TABLET ORAL
Status: DISCONTINUED | OUTPATIENT
Start: 2019-05-02 | End: 2019-05-04 | Stop reason: HOSPADM

## 2019-05-02 RX ORDER — LORAZEPAM 0.5 MG/1
0.5 TABLET ORAL
Status: DISCONTINUED | OUTPATIENT
Start: 2019-05-02 | End: 2019-05-04 | Stop reason: HOSPADM

## 2019-05-02 RX ORDER — LANOLIN ALCOHOL/MO/W.PET/CERES
1 CREAM (GRAM) TOPICAL
Status: DISCONTINUED | OUTPATIENT
Start: 2019-05-03 | End: 2019-05-03

## 2019-05-02 RX ORDER — PROMETHAZINE HYDROCHLORIDE 25 MG/ML
25 INJECTION, SOLUTION INTRAMUSCULAR; INTRAVENOUS
Status: DISCONTINUED | OUTPATIENT
Start: 2019-05-02 | End: 2019-05-04 | Stop reason: HOSPADM

## 2019-05-02 RX ORDER — MINERAL OIL
30 OIL (ML) ORAL AS NEEDED
Status: DISCONTINUED | OUTPATIENT
Start: 2019-05-02 | End: 2019-05-02 | Stop reason: HOSPADM

## 2019-05-02 RX ORDER — AMOXICILLIN 250 MG
1 CAPSULE ORAL
Status: DISCONTINUED | OUTPATIENT
Start: 2019-05-02 | End: 2019-05-04 | Stop reason: HOSPADM

## 2019-05-02 RX ORDER — IBUPROFEN 400 MG/1
800 TABLET ORAL
Status: DISCONTINUED | OUTPATIENT
Start: 2019-05-02 | End: 2019-05-02

## 2019-05-02 RX ORDER — IBUPROFEN 400 MG/1
800 TABLET ORAL 3 TIMES DAILY
Status: DISCONTINUED | OUTPATIENT
Start: 2019-05-02 | End: 2019-05-02

## 2019-05-02 RX ORDER — OXYTOCIN/RINGER'S LACTATE 20/1000 ML
2 PLASTIC BAG, INJECTION (ML) INTRAVENOUS ONCE
Status: DISCONTINUED | OUTPATIENT
Start: 2019-05-02 | End: 2019-05-02 | Stop reason: HOSPADM

## 2019-05-02 RX ORDER — BUTORPHANOL TARTRATE 1 MG/ML
2 INJECTION INTRAMUSCULAR; INTRAVENOUS
Status: DISCONTINUED | OUTPATIENT
Start: 2019-05-02 | End: 2019-05-02

## 2019-05-02 RX ORDER — IBUPROFEN 600 MG/1
600 TABLET ORAL
Status: DISCONTINUED | OUTPATIENT
Start: 2019-05-02 | End: 2019-05-02

## 2019-05-02 RX ORDER — SODIUM CHLORIDE, SODIUM LACTATE, POTASSIUM CHLORIDE, CALCIUM CHLORIDE 600; 310; 30; 20 MG/100ML; MG/100ML; MG/100ML; MG/100ML
125 INJECTION, SOLUTION INTRAVENOUS CONTINUOUS
Status: DISCONTINUED | OUTPATIENT
Start: 2019-05-02 | End: 2019-05-02 | Stop reason: HOSPADM

## 2019-05-02 RX ORDER — IBUPROFEN 400 MG/1
800 TABLET ORAL
Status: DISCONTINUED | OUTPATIENT
Start: 2019-05-02 | End: 2019-05-04 | Stop reason: HOSPADM

## 2019-05-02 RX ORDER — LIDOCAINE HYDROCHLORIDE 10 MG/ML
20 INJECTION, SOLUTION EPIDURAL; INFILTRATION; INTRACAUDAL; PERINEURAL AS NEEDED
Status: DISCONTINUED | OUTPATIENT
Start: 2019-05-02 | End: 2019-05-02 | Stop reason: HOSPADM

## 2019-05-02 RX ORDER — KETOROLAC TROMETHAMINE 30 MG/ML
15 INJECTION, SOLUTION INTRAMUSCULAR; INTRAVENOUS
Status: DISCONTINUED | OUTPATIENT
Start: 2019-05-02 | End: 2019-05-04 | Stop reason: HOSPADM

## 2019-05-02 RX ORDER — TERBUTALINE SULFATE 1 MG/ML
0.25 INJECTION SUBCUTANEOUS
Status: DISCONTINUED | OUTPATIENT
Start: 2019-05-02 | End: 2019-05-02 | Stop reason: HOSPADM

## 2019-05-02 RX ORDER — HYDROMORPHONE HYDROCHLORIDE 1 MG/ML
1 INJECTION, SOLUTION INTRAMUSCULAR; INTRAVENOUS; SUBCUTANEOUS
Status: DISCONTINUED | OUTPATIENT
Start: 2019-05-02 | End: 2019-05-02

## 2019-05-02 RX ORDER — OXYTOCIN/RINGER'S LACTATE 20/1000 ML
999 PLASTIC BAG, INJECTION (ML) INTRAVENOUS CONTINUOUS
Status: DISCONTINUED | OUTPATIENT
Start: 2019-05-02 | End: 2019-05-02 | Stop reason: HOSPADM

## 2019-05-02 RX ADMIN — ACETAMINOPHEN 1000 MG: 325 TABLET ORAL at 12:13

## 2019-05-02 RX ADMIN — KETOROLAC TROMETHAMINE 15 MG: 30 INJECTION, SOLUTION INTRAMUSCULAR at 17:46

## 2019-05-02 NOTE — PROGRESS NOTES
0700: Bedside and Verbal shift change report given to BRYAN Correia (oncoming nurse) by BRYAN Newman (offgoing nurse). Report included the following information SBAR, Kardex, Procedure Summary, MAR and Recent Results. 0725: Introduction to Pt, Discussed care plan, Pt verbalizes understanding of care plan. Safety issues check (food was removed due to a chocking hazard with the pt not being fully alert). Pt denies needs or assistance at this time. Morning assessment completed. Pt only oriented to self, unknown place and date (asked multiply times). Pt going in and out of sleeping- response to physical and verbal commands . Pt unable to sign consents to do alertness. 6252: Bag of LR started to help keep pt hydrated. Pt alert x3 but still very drowsy     0845: Pt up in bed with head hung and phone by ear. Pt still drowsy but alert     0915: Pt up in bed eating breakfast. Pt still drowsy but alert/ Head hung to the side and then being jerked up to eat again    1015: Pt sleeping in bed in fetal position with jerking movement    1115: Pt resting in bed, denies pain or further assistance needed at this time    1135: Pt up in bed and alert    1200: Called Dr. Zenaida Palafox related to the patients temperature of 102.3. Stated that she did not think it was related to infection rather to withdrawal. Stated to have blood culture x2, CBC, UA, and urinalysis. Stated also to give tylenol and retake temperature in an hour and call her to update the temperature      1215: Bedside and Verbal shift change report given to MEHRAN Graff (Postpartum nurse) by BRYAN Correia (Labor/Delivery nurse). Report included the following information SBAR, MAR, Results Reviewed.

## 2019-05-02 NOTE — PROGRESS NOTES
730-Pt in bed with eyes closed, opens eyes, and throws shoulders, and sits up, with un purposeful movements. Pt responds to voice, and physical touch. Pt oriented to name, and , place, and states she is the \"hospital\", after verbal question x 5. Pt unable to state Month, day, or year. Pt closes eyes, and hangs head with food in mouth, removed bedside food for safety. Discussed consents, pt attempted to sign name, pt unable to repeat information given to her. Will reassess to receive written consent, pt verbalized agrees to receive blood if necessary,. Pt unable to repeat information given on  safety consent. No second adult listed for baby band. Blankets replaced, gown placed. Pt states 0 pain.

## 2019-05-02 NOTE — PROGRESS NOTES
1220  Verbal and bedside report received from offgoing RNBRYAN, using SBAR, Kardex, and MAR.     1320  VS and reassessment done. VSS other than temp of 101.3. Pt told a urine sample is needed. Cup left in bathroom and told to call after she urinates.  in room to draw blood cultures, CBC and lactic acid. Pt drowsy and has jerking movements. Pt requesting baby be brought to room. Baby brought to room by KURTIS Griffin RN.    8181  Message left for Dr. Maria Del Rosario Serrato re: temp. 1425  PC from Dr. Maria Del Rosario Serrato.  Requests temp be taken at 1500 and order be placed stating pt is to walk with assistance only. Order placed. 1505  VS done. VSS. Temp down to 98.7.      1520  RN informed that pt had blood and urine on floor. Pt urinated in regular cup that had soda in it. Pt informed of how urine culture needs to be done. Told to call when she needs to void again. 0  PC to Dr. Dianna Anguiano left re: temp. 1600  PC from Dr. Maria Del Rosario Serrato. She spoke to DEBI Bernstein RN. No further orders. 1630  Visitors in to see pt. Pt states no needs at this time. Holding baby in bed. 1730  Pt up to void. Urine sample obtained for lab. Taken to lab by WILBERT Duval, [address]  Pt states pain is 5/10. Medicated per MAR with Toradol. 200  Pt requests baby in room. Velvet Cherry RN took baby to room. 1915  Verbal and bedside report given to oncoming RNMEHRAN, using SBAR, Kardex, and MAR.

## 2019-05-02 NOTE — PROGRESS NOTES
0300: Patient presents to L and D via medic for home delivery of live baby girl. Dr Meir Marroquin and Dr Carmela Rodríguez called to room. placenta was undelivered. Patient is compliant but has bouts of crying and is writhing on the bed stating that she wants the placenta out. 0310: Dr Meir Marroquin at bedside delivering placenta. Intact, sent to patho, minimal bleeding. 0330: Kaitlynn care completed on patient. Requesting something to eat and drink. Soda and crackers given. 0345: Dinner tray given to patient. 0400: Patient sleeping but easily arousable. 5: Patient has a friend in room who has brought her food. Patient very drowsy and is unable to sign consents at this time as she is unable to hold the pen and keeps falling asleep. 0515: Patient sleeping in bed. Friend has left.  9300: Patient sleeping. 5585: Tried to get patient up to restroom and then to [facility]. Patient still very sleepy and kept falling asleep while nurse was trying to talk to her. will remain in L and D till morning shift change. 0710: Bedside and Verbal shift change report given to Hanny Locke RN (oncoming nurse) by Elio Rinne, RN (offgoing nurse). Report included the following information SBAR, MAR, Recent Results and Med Rec Status.
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
## 2019-05-02 NOTE — PROGRESS NOTES
12  Rec'd PC at approx 09:00 from [facility],  re: order placed for pt to be seen by case management. Asked if baby was tested and told her baby was bagged. Results rec'd from baby's urine screen. PC to AFSHIN Pardo to inform. States she will check this afternoon to see if pt is more alert and can be seen then.

## 2019-05-02 NOTE — L&D DELIVERY NOTE
Delivery Summary    Patient: Sacha Echavarria MRN: [id number]  SSN: [id number]    YOB: 1995  Age: 22 y.o. Sex: female       Information for the patient's :  Marily Mahan Girl [[id number]]     Patient delivered at home and arrived to L and D via EMS with placenta not delivered. PROCEDURE NOTE FOR PLACENTA DELIVERY:    WITH GENTLE TRACTION ON CORD, PLACENTA WAS DELIVERED SPONTANEOUSLY AND INTACT AND SENT TO PATHOLOGY. PLACENTA APPEARED GROSSLY NORMAL. 3 VC. NO LACERATIONS. FUNDUS FIRM. EBL=200 CC. Labor Events:    Labor:      Steroids:     Cervical Ripening Date/Time:       Cervical Ripening Type:     Antibiotics During Labor:     Rupture Identifier:      Rupture Date/Time:       Rupture Type:     Amniotic Fluid Volume:      Amniotic Fluid Description:      Amniotic Fluid Odor:      Induction:         Induction Date/Time:        Indications for Induction:      Augmentation:     Augmentation Date/Time:      Indications for Augmentation:     Labor complications: Additional complications:        Delivery Events:  Indications For Episiotomy:     Episiotomy: None   Perineal Laceration(s): None   Repaired:     Periurethral Laceration Location:      Repaired:     Labial Laceration Location:     Repaired:     Sulcal Laceration Location:     Repaired:     Vaginal Laceration Location:     Repaired:     Cervical Laceration Location:     Repaired:     Repair Suture: None   Number of Repair Packets:     Estimated Blood Loss (ml):  ml     Delivery Date: 2019    Delivery Time: 2:40 AM  Delivery Type: Vaginal, Spontaneous  Sex:  Female    Gestational Age: <None>   Delivery Clinician:     Living Status:     Delivery Location:              APGARS  One minute Five minutes Ten minutes   Skin color:            Heart rate:            Grimace:            Muscle tone:            Breathing:             Totals:                Presentation:      Position:        Resuscitation Method: Meconium Stained:        Cord Information: 3 Vessels  Complications:    Cord around:    Delayed cord clamping? Cord clamped date/time:   Disposition of Cord Blood:      Blood Gases Sent?:      Placenta:  Date/Time: 2019  3:20 AM  Removal: Spontaneous      Appearance: Intact; Normal      Measurements:  Birth Weight: 5 lb 7.3 oz (2.475 kg)      Birth Length: 47 cm      Head Circumference: 31 cm      Chest Circumference: 31 cm     Abdominal Girth: 30 cm    Other Providers:    , Obstetrician;Primary Nurse;Primary Sarepta Nurse;Nicu Nurse;Neonatologist;Anesthesiologist;Crna;Nurse Practitioner;Midwife;Nursery Nurse       Group B Strep: No results found for: GRBSEXT, GRBSEXT  Information for the patient's :  Antonio Lo Girl [[id number]]   No results found for: ABORH, PCTABR, PCTDIG, BILI, ABORHEXT, ABORH    No results for input(s): PCO2CB, PO2CB, HCO3I, SO2I, IBD, PTEMPI, SPECTI, PHICB, ISITE, IDEV, IALLEN in the last 72 hours.

## 2019-05-03 LAB
C TRACH RRNA SPEC QL NAA+PROBE: POSITIVE
HCT VFR BLD AUTO: 30 % (ref 35–45)
HGB BLD-MCNC: 10 G/DL (ref 12–16)
HIV 1+2 AB+HIV1 P24 AG SERPL QL IA: NONREACTIVE
HIV12 RESULT COMMENT, HHIVC: NORMAL
LACTATE SERPL-SCNC: 2.1 MMOL/L (ref 0.4–2)
N GONORRHOEA RRNA SPEC QL NAA+PROBE: NEGATIVE
SPECIMEN SOURCE: ABNORMAL

## 2019-05-03 PROCEDURE — 85018 HEMOGLOBIN: CPT

## 2019-05-03 PROCEDURE — 85014 HEMATOCRIT: CPT

## 2019-05-03 PROCEDURE — 36415 COLL VENOUS BLD VENIPUNCTURE: CPT

## 2019-05-03 PROCEDURE — 65270000029 HC RM PRIVATE

## 2019-05-03 PROCEDURE — 74011250637 HC RX REV CODE- 250/637: Performed by: OBSTETRICS & GYNECOLOGY

## 2019-05-03 PROCEDURE — 83605 ASSAY OF LACTIC ACID: CPT

## 2019-05-03 RX ORDER — IBUPROFEN 800 MG/1
800 TABLET ORAL
Qty: 40 TAB | Refills: 1 | Status: SHIPPED | OUTPATIENT
Start: 2019-05-03 | End: 2021-04-05

## 2019-05-03 RX ORDER — ASCORBIC ACID 500 MG
500 TABLET ORAL DAILY
Qty: 30 TAB | Refills: 0 | Status: SHIPPED | OUTPATIENT
Start: 2019-05-03 | End: 2021-04-05

## 2019-05-03 RX ORDER — DOCUSATE SODIUM 100 MG/1
100 CAPSULE, LIQUID FILLED ORAL 2 TIMES DAILY
Status: DISCONTINUED | OUTPATIENT
Start: 2019-05-03 | End: 2019-05-04 | Stop reason: HOSPADM

## 2019-05-03 RX ORDER — LANOLIN ALCOHOL/MO/W.PET/CERES
1 CREAM (GRAM) TOPICAL 2 TIMES DAILY WITH MEALS
Status: DISCONTINUED | OUTPATIENT
Start: 2019-05-03 | End: 2019-05-04 | Stop reason: HOSPADM

## 2019-05-03 RX ORDER — ACETAMINOPHEN 500 MG
1000 TABLET ORAL
Qty: 30 TAB | Refills: 1 | Status: SHIPPED | OUTPATIENT
Start: 2019-05-03 | End: 2021-04-05

## 2019-05-03 RX ORDER — LANOLIN ALCOHOL/MO/W.PET/CERES
325 CREAM (GRAM) TOPICAL
Qty: 40 TAB | Refills: 1 | Status: SHIPPED | OUTPATIENT
Start: 2019-05-03 | End: 2021-04-05

## 2019-05-03 RX ORDER — DOCUSATE SODIUM 100 MG/1
100 CAPSULE, LIQUID FILLED ORAL 2 TIMES DAILY
Qty: 60 CAP | Refills: 2 | Status: SHIPPED | OUTPATIENT
Start: 2019-05-03 | End: 2019-08-01

## 2019-05-03 RX ORDER — ASCORBIC ACID 250 MG
500 TABLET ORAL DAILY
Status: DISCONTINUED | OUTPATIENT
Start: 2019-05-03 | End: 2019-05-04 | Stop reason: HOSPADM

## 2019-05-03 RX ADMIN — PRENATAL VITAMINS-IRON FUMARATE 27 MG IRON-FOLIC ACID 0.8 MG TABLET 1 TABLET: at 08:01

## 2019-05-03 RX ADMIN — ACETAMINOPHEN 1000 MG: 325 TABLET ORAL at 01:43

## 2019-05-03 RX ADMIN — FERROUS SULFATE TAB 325 MG (65 MG ELEMENTAL FE) 325 MG: 325 (65 FE) TAB at 08:01

## 2019-05-03 NOTE — ROUTINE PROCESS
0715 Bedside and Verbal shift change report given to Sebas Hawk RN (oncoming nurse) by Edgar Cisse RN (offgoing nurse). Report included the following information Abraham HOLLAND, MAR.     0745 Pt falling asleep during VS assessment and while holding baby. Pt very interested in seeing and holding infant. Baby to nursery. 1000 Pt agitated. Wants baby in room with her. While holding infant, she falls asleep. Have instructed her she may not sleep with infant. Pt promises she will not, however, doses off to sleep even while we are talking. 1100 Agitated/crying wants to talk with .  paged. 1400 Pt requesting \"meat\" as she feels light headed. VS normal. Pt reports this happens every day around this time and is relieved by food with meat. Dietary called, will bring sandwich.    [address] from lab called with critical lactic acid results of 2.1. Dr Sukhi Steele notified by phone. 1600 CPS in to see pt. Pt's father in room during visit. Pt signed release of records forms for herself and infant. Forms faxed to medical records per their instructions. Records will be faxed to CPS on Monday. CPS requests Dr Olga Lidia Last order urine drug screen as they felt pt was \"high\" during their interview. 200 Phone order from Dr Anand Sanchez for urine drug screen per CPS request. Manage Orders signature only allowed me to select Dr Josué Almodovar as ordering doctor. 1915 Bedside and Verbal shift change report given to Hector Longoria (oncoming nurse) by Sebas Hawk RN (offgoing nurse).  Report included the following information Abraham HOLLAND, MAR.

## 2019-05-03 NOTE — PROGRESS NOTES
1915: Bedside and Verbal shift change report given to MEHRAN Prieto RN (oncoming nurse) by Robin Syed. Agata Nevarez RN (offgoing nurse). Report included the following information SBAR, Kardex, Intake/Output, MAR and Recent Results. 0140: pt's oral temp: 102.2, tylenol administered. Will recheck temp again in an hour. Mews is 3    0240: temp on repeat: 99.4    Patient Vitals for the past 12 hrs:   Temp Pulse Resp BP SpO2   05/03/19 0240 99.4 °F (37.4 °C) 97 18 126/67 99 %   05/03/19 0140 (!) 102.2 °F (39 °C) 98 18 126/68 99 %   05/02/19 1915 97.4 °F (36.3 °C) 91 18 121/76 99 %     0715: Bedside and Verbal shift change report given to RUTH Lucio (oncoming nurse) by Robin Syed. Davy Prieto RN (offgoing nurse). Report included the following information SBAR, Kardex, Intake/Output, MAR and Recent Results.

## 2019-05-03 NOTE — PROGRESS NOTES
conducted an initial consultation and Spiritual Assessment for [employer], who is a 24 y.o.,female. Patients Primary Language is: [place]. According to the patients EMR Spiritism Affiliation is: [facility].     The reason the Patient came to the hospital is:   Patient Active Problem List    Diagnosis Date Noted    Pregnancy 05/02/2019    No prenatal care in current pregnancy in third trimester 05/02/2019    Major depression 07/25/2018        The  provided the following Interventions:  Patient aroused at my second attempt to visit when I knocked at her door. I introduced myself as the [employer] visiting her for her emotional and spiritual support. She greeted me back with a smile followed by furrowed eyebrow. When asked what's behind her worried look, she said, \"I want to take her home. \" I asked if she would like to talk about her concerns, she nodded to indicate \"yes\". She was very drowsy and  was \"in and out of it\" that the only information I gathered was that she has another [de-identified] year old girl. I was unable to explore issues with salas , belief, and her spirituality background. During my first attempt, I left a spiritual card that provides information about [id number] [facility]. Offered assurance of continued prayers on patient's behalf. Chart reviewed. The following outcomes where achieved:  Patient shared limited information about both their medical narrative and spiritual journey/beliefs.  confirmed Patient's Spiritism Affiliation. Patient processed feeling about current hospitalization. Patient expressed gratitude for 's visit. Assessment:  Patient was not able to share any Protestant/cultural needs that will affect patients preferences in health care. There are no spiritual or Protestant issues which require intervention at this time.      Plan:  Chaplains will continue to follow and will provide pastoral care on an as needed/requested basis when   recommends bedside caregivers page  on duty if patient shows signs of acute spiritual or emotional distress.     Chaplain Resident [address]   [phone]

## 2019-05-03 NOTE — PROGRESS NOTES
Received call from MsIsabela Tom Ramesh ([phone]) from CPS. Case will be assigned and am hoping that CPS will be coming to the hospital to speak with mom and touch base with staff today. Also received call from [facility], mom was requesting to see this CM. Saw pt at bedside. Pt asked about CPS and what to expect and appeared very nervous. Explained to pt that by law, CPS had to be contacted and it would be up to CPS to make their recommendations. Advised pt that baby could possibly be held at the hospital until CPS completed their investigation. Pt responded with, \" but I have bought so much for the baby\". Spent some time talking with pt and explained that it would be helpful for staff treating her baby to know how often cocaine was used. Pt was forthcoming with information as she wanted to help the baby if the baby would be going through withdrawals. Pt expressed that she \"snorted everyday\". Information was relayed to nursery staff and CM expressed gratitude to mom for helping her baby. Pt responded by saying, \" just look at her, I want her to be ok\".       Miya Mendoza, [phone]

## 2019-05-03 NOTE — DISCHARGE SUMMARY
Obstetrical Discharge Summary     Name: Brianna Segovia MRN: [id number]  SSN: [id number]    YOB: 1995  Age: 22 y.o. Sex: female      Admit Date: 2019    Discharge Date: 2019    Admitting Physician: Yuki Dumont MD     Attending Physician:  Trish Velazquez MD     Discharge Diagnoses:   Information for the patient's :  Marlodylon Thrasher Girl [[id number]]   Delivery of a 5 lb 7.3 oz (2.475 kg) female infant via Vaginal, Spontaneous on 2019 at 2:40 AM  by . Apgars were  and . Patient Active Problem List   Diagnosis Code    Major depression F32.9    Pregnancy Z34.90    No prenatal care in current pregnancy in third trimester O09.33       Additional Diagnoses:   Problem List as of 5/3/2019 Date Reviewed: 2019          Codes Class Noted - Resolved    Pregnancy ICD-10-CM: Z34.90  ICD-9-CM: V22.2  2019 - Present        No prenatal care in current pregnancy in third trimester ICD-10-CM: O09.33  ICD-9-CM: V23.7  2019 - Present        Major depression ICD-10-CM: F32.9  ICD-9-CM: 296.20  2018 - Present        RESOLVED: Teen pregnancy ICD-10-CM: ASW7160  ICD-9-CM: 659.80  2011 - 2019        RESOLVED: Pregnancy, normal ICD-9-CM: V22.2  2011 - 2019        RESOLVED: Amenorrhea, secondary ICD-10-CM: N91.1  ICD-9-CM: 626.0  2011 - 2019            No results found for: RUBELLAEXT, GRBSEXT  Recent Labs     19  0635   HGB [facility] Course: Normal hospital course following the delivery. Patient Instructions:   Current Discharge Medication List      START taking these medications    Details   acetaminophen (TYLENOL) 500 mg tablet Take 2 Tabs by mouth every six (6) hours as needed for Fever. Qty: 30 Tab, Refills: 1      ascorbic acid, vitamin C, (VITAMIN C) 500 mg tablet Take 1 Tab by mouth daily. Qty: 30 Tab, Refills: 0      docusate sodium (COLACE) 100 mg capsule Take 1 Cap by mouth two (2) times a day for 90 days.   Qty: 60 Cap, Refills: 2      ferrous sulfate 325 mg (65 mg iron) tablet Take 1 Tab by mouth Daily (before breakfast). Qty: 40 Tab, Refills: 1      ibuprofen (MOTRIN) 800 mg tablet Take 1 Tab by mouth every eight (8) hours as needed for Pain. Qty: 40 Tab, Refills: 01         CONTINUE these medications which have NOT CHANGED    Details   ARIPiprazole (ABILIFY) 5 mg tablet Take 1 Tab by mouth daily. Indications: DEPRESSION TREATMENT ADJUNCT  Qty: 30 Tab, Refills: 0      buPROPion XL (WELLBUTRIN XL) 150 mg tablet Take 1 Tab by mouth every morning. Indications: major depressive disorder, Schizoaffective Disorder  Qty: 30 Tab, Refills: 0                 No orders of the defined types were placed in this encounter.        Signed By:  Maureen Kitchen MD     May 3, 2019 1:09 PM                      BST
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
## 2019-05-03 NOTE — PROGRESS NOTES
1926- Verbal and bedside report received from offgoing RN,RUTH Matos, using SBAR, Kardex, and MAR. Please see Patient Observation flowsheet for shift activities. 0735-Verbal and bedside report given to oncoming RN,BRYAN Correia, using SBAR, Kardex, and MAR.

## 2019-05-03 NOTE — PROGRESS NOTES
Post-Partum Day Number 1 Progress Note    @  Patient: Jeff Rees MRN: [id number]  SSN: [id number]    YOB: 1995  Age: 22 y.o. Sex: female      Subjective:     PostPartum Day: 1     Delivery: vaginal delivery    Tin fever overnight 102. 3. Thought to be 2/2 withdrawal symptoms. Given ativan prn. The patient feels well today. Pain is  well controlled with current medications. The baby is well. Baby is feeding via bottle. Voiding spontaneous. The patient is ambulating well. The patient is tolerating a normal diet. Lochia less than a period. Objective:      Patient Vitals for the past 8 hrs:   BP Temp Pulse Resp SpO2   05/03/19 0744 114/73 97.4 °F (36.3 °C) 75 22 100 %     General:    alert, cooperative, no distress   Fundus:   firm, FF at umbilicus   Extremities: No erythema, tenderness, edema   DVT Evaluation:  No evidence of DVT seen on physical exam.     Lab/Data Review:  Recent Results (from the past 24 hour(s))   CBC WITH AUTOMATED DIFF    Collection Time: 05/02/19  1:06 PM   Result Value Ref Range    WBC 7.4 4.6 - 13.2 K/uL    RBC 3.07 (L) 4.20 - 5.30 M/uL    HGB 9.5 (L) 12.0 - 16.0 g/dL    HCT 27.5 (L) 35.0 - 45.0 %    MCV 89.6 74.0 - 97.0 FL    MCH 30.9 24.0 - 34.0 PG    MCHC 34.5 31.0 - 37.0 g/dL    RDW 13.4 11.6 - 14.5 %    PLATELET 417 846 - 429 K/uL    MPV 10.0 9.2 - 11.8 FL    NEUTROPHILS 78 (H) 40 - 73 %    LYMPHOCYTES 14 (L) 21 - 52 %    MONOCYTES 8 3 - 10 %    EOSINOPHILS 0 0 - 5 %    BASOPHILS 0 0 - 2 %    ABS. NEUTROPHILS 5.8 1.8 - 8.0 K/UL    ABS. LYMPHOCYTES 1.0 0.9 - 3.6 K/UL    ABS. MONOCYTES 0.6 0.05 - 1.2 K/UL    ABS. EOSINOPHILS 0.0 0.0 - 0.4 K/UL    ABS.  BASOPHILS 0.0 0.0 - 0.1 K/UL    DF AUTOMATED     CULTURE, BLOOD    Collection Time: 05/02/19  1:06 PM   Result Value Ref Range    Special Requests: NO SPECIAL REQUESTS      Culture result: NO GROWTH AFTER 18 HOURS     LACTIC ACID    Collection Time: 05/02/19  1:06 PM   Result Value Ref Range    Lactic acid 2.9 (HH) 0.4 - 2. 0 MMOL/L   CULTURE, BLOOD    Collection Time: 05/02/19  1:13 PM   Result Value Ref Range    Special Requests: NO SPECIAL REQUESTS      Culture result: NO GROWTH AFTER 18 HOURS     URINALYSIS W/MICROSCOPIC    Collection Time: 05/02/19  5:45 PM   Result Value Ref Range    Color RED      Appearance TURBID      Specific gravity 1.015 1.005 - 1.030      pH (UA) 7.0 5.0 - 8.0      Protein 100 (A) NEG mg/dL    Glucose NEGATIVE  NEG mg/dL    Ketone NEGATIVE  NEG mg/dL    Bilirubin NEGATIVE  NEG      Blood LARGE (A) NEG      Urobilinogen 1.0 0.2 - 1.0 EU/dL    Nitrites NEGATIVE  NEG      Leukocyte Esterase SMALL (A) NEG      WBC  0 - 4 /hpf     UNABLE TO QUANTITATE MICROSCOPIC PARAMETERS DUE TO EXCESSIVE WBCS    RBC TOO NUMEROUS TO COUNT 0 - 5 /hpf    Epithelial cells  0 - 5 /lpf     UNABLE TO QUANTITATE MICROSCOPIC PARAMETERS DUE TO EXCESSIVE RBCS    Bacteria (A) NEG /hpf     UNABLE TO QUANTITATE MICROSCOPIC PARAMETERS DUE TO EXCESSIVE RBCS   HEMOGLOBIN    Collection Time: 05/03/19  6:35 AM   Result Value Ref Range    HGB 10.0 (L) 12.0 - 16.0 g/dL   HEMATOCRIT    Collection Time: 05/03/19  6:35 AM   Result Value Ref Range    HCT 30.0 (L) 35.0 - 45.0 %        Assessment:     Status post: Postpartum vaginal delivery complicated by no prenatal care, home delivery, +cocaine abuse  Patient Active Problem List   Diagnosis Code    Major depression F32.9    Pregnancy Z34.90    No prenatal care in current pregnancy in third trimester O09.33       Plan:     1. Doing well, continue routine postpartum care. 2. Fever: withdrawal symptoms vs. Infection--> blood and urine cultures negative to date. CBC with no leukocytosis. Lactic acid 2.9 -->will repeat. Pt currently asymptomatic and afebrile. No signs of uterine tenderness on exam to suggest postpartum endometritis. No indication for antibiotics at this time. Will continue to monitor. 3. H/o cocaine abuse: +UDS for cocaine, baby tested positive for cocaine.  Seen by Case management. Awaiting CPS. 4. Ok to d/c mom home tomorrow if patient remains stable and afebrile.       Signed By: Bayron Oakes MD     May 3, 2019 12:55 PM

## 2019-05-03 NOTE — PROGRESS NOTES
1635: Assumed care of patient. Patient requesting baby in room as her father is visiting. Patient taken from Nursery and placed along side mothers bed. Family at bedside with patient.

## 2019-05-03 NOTE — PROGRESS NOTES
conducted a Follow up consultation and Spiritual Assessment for Landon Rousseau, who is a 24 y.o.,female. I responded to a call from Staff. I was told patient was emotionally in distress and wouldn't eat her lunch and wanted to see the . When I came to the patient's room, patient was on the phone but immediately hanged up and promised to call back in five minutes. I continued the relationship of care and support. Patient at this time was in her normal self, coming to  understand why she couldn't take her baby home as was told by case management. \"It's because of the withdrawal.\" She also shared that she hasn't spoken to CPS yet. She also shared that she could not trust her friends and she is family-oriented and will be staying with mom who is raising up her [de-identified] year old girl. She immediately asked for prayers-to pray for her and her baby. While praying, she was \"out\" again. Assurance of continued prayer on her behalf was offered. Patient expressed gratitude for 's visit. Assessment:  Patient was no longer in any emotional crisis at the time of visit. There are no further spiritual or Sikhism issues which require Spiritual Care Service intervention at this time. Plan:   will continue to follow and will provide pastoral care on an as needed/requested basis.  recommends bedside caregivers page  on duty if patient shows signs of acute spiritual or emotional distress. Possible  is recommended.        Chaplain Resident [address]   [phone]

## 2019-05-04 VITALS
RESPIRATION RATE: 16 BRPM | DIASTOLIC BLOOD PRESSURE: 76 MMHG | HEART RATE: 70 BPM | OXYGEN SATURATION: 100 % | TEMPERATURE: 96.8 F | SYSTOLIC BLOOD PRESSURE: 122 MMHG

## 2019-05-04 LAB
AMPHET UR QL SCN: NEGATIVE
BACTERIA SPEC CULT: ABNORMAL
BARBITURATES UR QL SCN: NEGATIVE
BENZODIAZ UR QL: NEGATIVE
CANNABINOIDS UR QL SCN: NEGATIVE
COCAINE UR QL SCN: POSITIVE
HDSCOM,HDSCOM: ABNORMAL
METHADONE UR QL: NEGATIVE
OPIATES UR QL: NEGATIVE
PCP UR QL: NEGATIVE
SERVICE CMNT-IMP: ABNORMAL

## 2019-05-04 PROCEDURE — 74011250637 HC RX REV CODE- 250/637: Performed by: OBSTETRICS & GYNECOLOGY

## 2019-05-04 PROCEDURE — 80307 DRUG TEST PRSMV CHEM ANLYZR: CPT

## 2019-05-04 RX ORDER — AZITHROMYCIN 250 MG/1
1000 TABLET, FILM COATED ORAL ONCE
Status: COMPLETED | OUTPATIENT
Start: 2019-05-04 | End: 2019-05-04

## 2019-05-04 RX ADMIN — IBUPROFEN 800 MG: 400 TABLET, FILM COATED ORAL at 02:38

## 2019-05-04 RX ADMIN — FERROUS SULFATE TAB 325 MG (65 MG ELEMENTAL FE) 325 MG: 325 (65 FE) TAB at 08:51

## 2019-05-04 RX ADMIN — PRENATAL VITAMINS-IRON FUMARATE 27 MG IRON-FOLIC ACID 0.8 MG TABLET 1 TABLET: at 08:51

## 2019-05-04 RX ADMIN — ASCORBIC ACID TAB 250 MG 500 MG: 250 TAB at 08:51

## 2019-05-04 RX ADMIN — AZITHROMYCIN 1000 MG: 250 TABLET, FILM COATED ORAL at 11:29

## 2019-05-04 RX ADMIN — DOCUSATE SODIUM 100 MG: 100 CAPSULE, LIQUID FILLED ORAL at 08:51

## 2019-05-04 NOTE — PROGRESS NOTES
Called by RN this am for +Chlamydia on vaginal swab. Pt given Azithromycin 1g po x1. Discussed with patient that she will need to make sure her partner is treated to prevent reinfection and to refrain from sexual intercourse until 1 week after treatment. Pt expressed understanding. Answered all of her questions. Lactic acid redrawn yesterday and noted to be downtrending. Pt afebrile x24hrs. Plan for discharge home today.

## 2019-05-04 NOTE — PROGRESS NOTES
Case Management contacted regarding  Mother and Franky Daigle being medically ready for discharge today, however no one has heard from [address] [place], [place] [address]id [facility] regarding a plan for baby. Baby is no longer exhibiting any withdrawal symptoms. CM called [facility] hotline and on call worker ([phone]) forwarded CM to worker on the case, Ms. Natalee Kramer [phone]), who came out and saw the baby yesterday, Ms. Natalee Kramer was very concerned about the baby's tremors yesterday. Ms. Natalee Kramer stated that a discharge plan has not been established yet, however the baby will not be returning to mother's care. [facility] is requesting that [facility] hold the baby until at least Monday when they can decide on a custody plan, case will need to be staffed between Ms. Natalee Kramer, on call worker, and supervisor. CM informed Nursing staff in mother baby, who will relay information to attending physician. CM , Christiane Burns also notified.          Salima Gray, LOUIE  Case Management  [phone]

## 2019-05-04 NOTE — PROGRESS NOTES
0700: Bedside and Verbal shift change report given to BRYAN Correia (oncoming nurse) by Janie Laboy (offgoing nurse). Report included the following information SBAR.     5252: Introduction to Pt, Discussed care plan, Pt verbalizes understanding of care plan. Safety issues check. Pt denies needs or assistance at this time. 2107: Morning assessment completed, pt in bed resting, denies pain or further assistance needed at this time. Discussed with patient regarding the TDAP, Flu and Rubella vaccinations. Pt declines    1045: Dr. Cali Adams called regarding the patients chlamydia's status being positive. Stated to give the patient a one time does of azithromycin and pt is still to be discharged. 1130: D/C teaching completed. Copy of D/C teaching/instructions given to Pt. Pt verbalizes understanding. Pt given opportunity for questions.  Pt denies comments/concerns/questions at this time     1326: Pt D/C off the unit with family at side

## 2019-05-08 LAB
BACTERIA SPEC CULT: NORMAL
BACTERIA SPEC CULT: NORMAL
SERVICE CMNT-IMP: NORMAL
SERVICE CMNT-IMP: NORMAL

## 2019-07-11 ENCOUNTER — HOSPITAL ENCOUNTER (EMERGENCY)
Age: 24
Discharge: HOME | End: 2019-07-11
Attending: EMERGENCY MEDICINE
Payer: MEDICAID

## 2019-07-11 VITALS
WEIGHT: 174 LBS | OXYGEN SATURATION: 99 % | DIASTOLIC BLOOD PRESSURE: 87 MMHG | HEART RATE: 85 BPM | HEIGHT: 65 IN | SYSTOLIC BLOOD PRESSURE: 133 MMHG | BODY MASS INDEX: 28.99 KG/M2 | RESPIRATION RATE: 16 BRPM | TEMPERATURE: 97.3 F
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
## 2019-07-11 DIAGNOSIS — S09.90XA CLOSED HEAD INJURY, INITIAL ENCOUNTER: ICD-10-CM

## 2019-07-11 PROCEDURE — 99281 EMR DPT VST MAYX REQ PHY/QHP: CPT

## 2019-07-11 RX ORDER — METAXALONE 800 MG/1
800 TABLET ORAL 4 TIMES DAILY
Qty: 12 TAB | Refills: 0 | Status: SHIPPED | OUTPATIENT
Start: 2019-07-11 | End: 2019-07-16

## 2019-07-11 RX ORDER — LIDOCAINE HCL 4 G/100G
CREAM TOPICAL
Qty: 1 TUBE | Refills: 0 | Status: SHIPPED | OUTPATIENT
Start: 2019-07-11 | End: 2021-04-05
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
## 2020-10-24 ENCOUNTER — APPOINTMENT (OUTPATIENT)
Dept: GENERAL RADIOLOGY | Age: 25
End: 2020-10-24
Attending: EMERGENCY MEDICINE
Payer: MEDICAID

## 2020-10-24 ENCOUNTER — HOSPITAL ENCOUNTER (EMERGENCY)
Age: 25
Discharge: HOME | End: 2020-10-24
Attending: EMERGENCY MEDICINE
Payer: MEDICAID

## 2020-10-24 VITALS
HEART RATE: 74 BPM | DIASTOLIC BLOOD PRESSURE: 93 MMHG | WEIGHT: 196 LBS | OXYGEN SATURATION: 100 % | BODY MASS INDEX: 32.65 KG/M2 | TEMPERATURE: 98.6 F | RESPIRATION RATE: 14 BRPM | HEIGHT: 65 IN | SYSTOLIC BLOOD PRESSURE: 133 MMHG

## 2020-10-24 DIAGNOSIS — S20.219A CONTUSION OF CHEST WALL, UNSPECIFIED LATERALITY, INITIAL ENCOUNTER: ICD-10-CM

## 2020-10-24 DIAGNOSIS — S16.1XXA NECK STRAIN, INITIAL ENCOUNTER: ICD-10-CM
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
## 2020-10-24 PROCEDURE — 99283 EMERGENCY DEPT VISIT LOW MDM: CPT

## 2020-10-24 PROCEDURE — 72040 X-RAY EXAM NECK SPINE 2-3 VW: CPT

## 2020-10-24 PROCEDURE — 71045 X-RAY EXAM CHEST 1 VIEW: CPT

## 2020-10-24 PROCEDURE — 74011250637 HC RX REV CODE- 250/637: Performed by: EMERGENCY MEDICINE

## 2020-10-24 RX ORDER — METHOCARBAMOL 750 MG/1
750 TABLET, FILM COATED ORAL
Qty: 12 TAB | Refills: 0 | Status: SHIPPED | OUTPATIENT
Start: 2020-10-24 | End: 2020-10-29

## 2020-10-24 RX ORDER — NAPROXEN 250 MG/1
500 TABLET ORAL
Status: COMPLETED | OUTPATIENT
Start: 2020-10-24 | End: 2020-10-24

## 2020-10-24 RX ORDER — METHOCARBAMOL 750 MG/1
750 TABLET, FILM COATED ORAL ONCE
Status: COMPLETED | OUTPATIENT
Start: 2020-10-24 | End: 2020-10-24

## 2020-10-24 RX ADMIN — NAPROXEN 500 MG: 250 TABLET ORAL at 10:25

## 2020-10-24 RX ADMIN — METHOCARBAMOL 750 MG: 750 TABLET ORAL at 10:25
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
## 2020-10-24 NOTE — LETTER
NOTIFICATION RETURN TO WORK / SCHOOL 
 
10/24/2020 11:00 AM 
 
Ms. Vanessa Guillen [address] [facility] [address] To Whom It May Concern: 
 
Vanessa Guillen is currently under the care of [address] [facility] EMERGENCY DEPT. She will return to work/school on: 10/27/2020 If there are questions or concerns please have the patient contact our office. Sincerely, 
 
 
Dr. Westley Li
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
## 2020-11-21 ENCOUNTER — HOSPITAL ENCOUNTER (EMERGENCY)
Age: 25
Discharge: HOME | End: 2020-11-21
Attending: EMERGENCY MEDICINE
Payer: MEDICAID

## 2020-11-21 VITALS
DIASTOLIC BLOOD PRESSURE: 84 MMHG | HEART RATE: 84 BPM | TEMPERATURE: 99.2 F | OXYGEN SATURATION: 100 % | RESPIRATION RATE: 16 BRPM | BODY MASS INDEX: 33.32 KG/M2 | WEIGHT: 200 LBS | HEIGHT: 65 IN | SYSTOLIC BLOOD PRESSURE: 128 MMHG

## 2020-11-21 DIAGNOSIS — K62.5 RECTAL BLEEDING: Primary | ICD-10-CM

## 2020-11-21 LAB
ANION GAP SERPL CALC-SCNC: 6 MMOL/L (ref 3–18)
BASOPHILS # BLD: 0 K/UL (ref 0–0.1)
BASOPHILS NFR BLD: 0 % (ref 0–2)
BUN SERPL-MCNC: 12 MG/DL (ref 7–18)
BUN/CREAT SERPL: 11 (ref 12–20)
CALCIUM SERPL-MCNC: 8.8 MG/DL (ref 8.5–10.1)
CHLORIDE SERPL-SCNC: 111 MMOL/L (ref 100–111)
CO2 SERPL-SCNC: 22 MMOL/L (ref 21–32)
CREAT SERPL-MCNC: 1.08 MG/DL (ref 0.6–1.3)
DIFFERENTIAL METHOD BLD: ABNORMAL
EOSINOPHIL # BLD: 0.1 K/UL (ref 0–0.4)
EOSINOPHIL NFR BLD: 1 % (ref 0–5)
ERYTHROCYTE [DISTWIDTH] IN BLOOD BY AUTOMATED COUNT: 14.8 % (ref 11.6–14.5)
GLUCOSE SERPL-MCNC: 79 MG/DL (ref 74–99)
HCG SERPL QL: NEGATIVE
HCT VFR BLD AUTO: 37.4 % (ref 35–45)
HEMOCCULT STL QL: NEGATIVE
HGB BLD-MCNC: 12.6 G/DL (ref 12–16)
LYMPHOCYTES # BLD: 2.5 K/UL (ref 0.9–3.6)
LYMPHOCYTES NFR BLD: 21 % (ref 21–52)
MCH RBC QN AUTO: 31.9 PG (ref 24–34)
MCHC RBC AUTO-ENTMCNC: 33.7 G/DL (ref 31–37)
MCV RBC AUTO: 94.7 FL (ref 74–97)
MONOCYTES # BLD: 0.8 K/UL (ref 0.05–1.2)
MONOCYTES NFR BLD: 7 % (ref 3–10)
NEUTS SEG # BLD: 8.6 K/UL (ref 1.8–8)
NEUTS SEG NFR BLD: 71 % (ref 40–73)
PLATELET # BLD AUTO: 437 K/UL (ref 135–420)
PMV BLD AUTO: 9.3 FL (ref 9.2–11.8)
POTASSIUM SERPL-SCNC: 4.6 MMOL/L (ref 3.5–5.5)
RBC # BLD AUTO: 3.95 M/UL (ref 4.2–5.3)
SODIUM SERPL-SCNC: 139 MMOL/L (ref 136–145)
WBC # BLD AUTO: 12 K/UL (ref 4.6–13.2)

## 2020-11-21 PROCEDURE — 82270 OCCULT BLOOD FECES: CPT

## 2020-11-21 PROCEDURE — 80048 BASIC METABOLIC PNL TOTAL CA: CPT

## 2020-11-21 PROCEDURE — 85025 COMPLETE CBC W/AUTO DIFF WBC: CPT

## 2020-11-21 PROCEDURE — 99283 EMERGENCY DEPT VISIT LOW MDM: CPT

## 2020-11-21 PROCEDURE — 84703 CHORIONIC GONADOTROPIN ASSAY: CPT

## 2020-11-21 NOTE — DISCHARGE INSTRUCTIONS
Colonoscopy: Before Your Procedure  What is a colonoscopy? A colonoscopy is a test that lets a doctor look inside your colon. The doctor uses a thin, lighted tube called a colonoscope to look for problems. These include small growths called polyps, cancer, or bleeding. During the test, the doctor can take samples of tissue that can be checked for cancer or other problems. This is called a biopsy. The doctor can also take out polyps. Before the test, you will need to stop eating solid foods. You also will drink a liquid or take a tablet that cleans out your colon. This helps your doctor be able to see inside your colon during the test.  Follow-up care is a key part of your treatment and safety. Be sure to make and go to all appointments, and call your doctor if you are having problems. It's also a good idea to know your test results and keep a list of the medicines you take. How do you prepare for the procedure? Procedures can be stressful. This information will help you understand what you can expect. And it will help you safely prepare for your procedure. Preparing for the procedure    · Be sure you have someone to take you home. Anesthesia and pain medicine will make it unsafe for you to drive or get home on your own. · Understand exactly what procedure is planned, along with the risks, benefits, and other options.     · Tell your doctor ALL the medicines, vitamins, supplements, and herbal remedies you take. Some may increase the risk of problems during your procedure. Your doctor will tell you if you should stop taking any of them before the procedure and how soon to do it.     · If you take aspirin or some other blood thinner, ask your doctor if you should stop taking it before your procedure. Make sure that you understand exactly what your doctor wants you to do. These medicines increase the risk of bleeding.     · Make sure your doctor and the hospital have a copy of your advance directive.  If you don't have one, you may want to prepare one. It lets others know your health care wishes. It's a good thing to have before any type of surgery or procedure. Before the procedure    · Follow your doctor's directions about when to stop eating solid foods and drink only clear liquids. You can drink water, clear juices, clear broths, flavored ice pops, and gelatin (such as Jell-O). Do not eat or drink anything red or purple. This includes grape juice and grape-flavored ice pops. It also includes fruit punch and cherry gelatin.     · Drink the \"colon prep\" liquid as your doctor tells you. You will want to stay home, because the liquid will make you go to the bathroom a lot. Your stools will be loose and watery. It's very important to drink all of the liquid. If you have problems drinking it, call your doctor. Some doctors may have you take a tablet rather than drink a liquid.     · Do not eat any solid foods after you drink the colon prep.     · Stop drinking clear liquids 6 to 8 hours before the test.   What happens on the day of the procedure? · Follow the instructions exactly about when to stop eating and drinking. If you don't, your procedure may be canceled. If your doctor told you to take your medicines on the day of the procedure, take them with only a sip of water.     · Take a bath or shower before you come in for your procedure. Do not apply lotions, perfumes, deodorants, or nail polish.     · Take off all jewelry and piercings. And take out contact lenses, if you wear them. At the doctor's office or hospital   · Bring a picture ID.     · You will be kept comfortable and safe by your anesthesia provider. The anesthesia may make you sleep.     · You will lie on your back or your side with your knees drawn up toward your belly. The doctor will gently put a gloved finger into your anus. Then the doctor puts the scope in and moves it into your colon.  The scope goes in easily because it is lubricated.     · The doctor may also use small tools to take tissue samples for a biopsy or to remove polyps. This does not hurt.     · The test usually takes 30 to 45 minutes. But it may take longer. It depends on what is found and what is done. When should you call your doctor? · You have questions or concerns.     · You don't understand how to prepare for your procedure.     · You are having trouble with the bowel prep.     · You become ill before the procedure (such as fever, flu, or a cold).     · You need to reschedule or have changed your mind about having the procedure. Where can you learn more? Go to http://www.gray.com/  Enter C315 in the search box to learn more about \"Colonoscopy: Before Your Procedure. \"  Current as of: April 29, 2020               Content Version: 12.6  © 0011-8751 [employer]. Care instructions adapted under license by [employer] (which disclaims liability or warranty for this information). If you have questions about a medical condition or this instruction, always ask your healthcare professional. [place] [address] any warranty or liability for your use of this information. Patient Education        Rectal Bleeding: Care Instructions  Your Care Instructions     Rectal bleeding in small amounts is common. You may see red spotting on toilet paper or drops of blood in the toilet. Rectal bleeding has many possible causes, from something as minor as hemorrhoids to something as serious as colon cancer. You may need more tests to find the cause of your bleeding. Follow-up care is a key part of your treatment and safety. Be sure to make and go to all appointments, and call your doctor if you are having problems. It's also a good idea to know your test results and keep a list of the medicines you take. How can you care for yourself at home?   · Avoid aspirin and other nonsteroidal anti-inflammatory drugs (NSAIDs), such as ibuprofen (Advil, Motrin) and naproxen (Aleve). They can cause you to bleed more. Ask your doctor if you can take acetaminophen (Tylenol). Read and follow all instructions on the label. · Use a stool softener that contains bran or psyllium. You can save money by buying bran or psyllium (available in bulk at most health food stores) and sprinkling it on foods or stirring it into fruit juice. You can also use a product such as Metamucil or Citrucel. · Take your medicines exactly as directed. Call your doctor if you think you are having a problem with your medicine. When should you call for help? Call 911 anytime you think you may need emergency care. For example, call if:    · You passed out (lost consciousness). Call your doctor now or seek immediate medical care if:    · You have new or worse pain.     · You have new or worse bleeding from the rectum.     · You are dizzy or light-headed, or you feel like you may faint. Watch closely for changes in your health, and be sure to contact your doctor if:    · You cannot pass stools or gas.     · You do not get better as expected. Where can you learn more? Go to http://www.gray.com/  Enter C958 in the search box to learn more about \"Rectal Bleeding: Care Instructions. \"  Current as of: April 15, 2020               Content Version: 12.6  © 4594-2985 [employer]. Care instructions adapted under license by [employer] (which disclaims liability or warranty for this information). If you have questions about a medical condition or this instruction, always ask your healthcare professional. [address] any warranty or liability for your use of this information. [employer] Activation    Thank you for requesting access to [employer]. Please follow the instructions below to securely access and download your online medical record.  [employer] allows you to send messages to your doctor, view your test results, renew your prescriptions, schedule appointments, and more. How Do I Sign Up? 1. In your internet browser, go to [email or web address]  2. Click on the First Time User? Click Here link in the Sign In box. You will be redirect to the New Member Sign Up page. 3. Enter your [employer] [facility] Access Code exactly as it appears below. You will not need to use this code after youve completed the sign-up process. If you do not sign up before the expiration date, you must request a new code. [employer] Access Code: [email or web address]  Expires: 2020  9:16 AM (This is the date your [employer] access code will )    4. Enter the last four digits of your Social Security Number (xxxx) and Date of Birth (mm/dd/yyyy) as indicated and click Submit. You will be taken to the next sign-up page. 5. Create a [facility] [facility] ID. This will be your [employer] login ID and cannot be changed, so think of one that is secure and easy to remember. 6. Create a [employer] password. You can change your password at any time. 7. Enter your Password Reset Question and Answer. This can be used at a later time if you forget your password. 8. Enter your e-mail address. You will receive e-mail notification when new information is available in [address] 9. Click Sign Up. You can now view and download portions of your medical record. 10. Click the Download Summary menu link to download a portable copy of your medical information. Additional Information    If you have questions, please visit the Frequently Asked Questions section of the [employer] website at [email or web address] Remember, [employer] is NOT to be used for urgent needs. For medical emergencies, dial 911.
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
## 2020-11-21 NOTE — ED PROVIDER NOTES
EMERGENCY DEPARTMENT HISTORY AND PHYSICAL EXAM    Date: 11/21/2020  Patient Name: Joanna Kim    History of Presenting Illness     Chief Complaint   Patient presents with    Melena         History Provided By: patient    Chief Complaint: rectal bleeding with bowel movements  Duration: 2 months   Timing:  Chronic   Location: rectal   Quality: BRB  Severity: moderate  Modifying Factors: none  Associated Symptoms: intermittent constipation       Additional History (Context): Joanna Kim is a 26 y.o. female with no pertinent PMH who presents with c/o BRBPR with each BM x 2 months. Pt reports intermittent constipation with straining to have a BM. She denies abd pain and rectal pain. Last BM this am. No other complaints reported at this time. PCP: None    Current Outpatient Medications   Medication Sig Dispense Refill    lidocaine (XYLOCAINE) 4 % topical cream Apply  to affected area three (3) times daily as needed for Pain. 1 Tube 0    acetaminophen (TYLENOL) 500 mg tablet Take 2 Tabs by mouth every six (6) hours as needed for Fever. 30 Tab 1    ascorbic acid, vitamin C, (VITAMIN C) 500 mg tablet Take 1 Tab by mouth daily. 30 Tab 0    ferrous sulfate 325 mg (65 mg iron) tablet Take 1 Tab by mouth Daily (before breakfast). 40 Tab 1    ibuprofen (MOTRIN) 800 mg tablet Take 1 Tab by mouth every eight (8) hours as needed for Pain. 40 Tab 01    ARIPiprazole (ABILIFY) 5 mg tablet Take 1 Tab by mouth daily. Indications: DEPRESSION TREATMENT ADJUNCT 30 Tab 0    buPROPion XL (WELLBUTRIN XL) 150 mg tablet Take 1 Tab by mouth every morning. Indications: major depressive disorder, Schizoaffective Disorder 30 Tab 0       Past History     Past Medical History:  Past Medical History:   Diagnosis Date    STD (female)        Past Surgical History:  History reviewed. No pertinent surgical history. Family History:  History reviewed. No pertinent family history.     Social History:  Social History     Tobacco Use    Smoking status: Current Every Day Smoker     Types: Cigarettes    Smokeless tobacco: Never Used   Substance Use Topics    Alcohol use: No     Comment: occasionally    Drug use: No     Types: Marijuana       Allergies: Allergies   Allergen Reactions    Pcn [Penicillins] Anaphylaxis     Patient denies allergy         Review of Systems   Review of Systems   Constitutional: Negative. Negative for chills and fever. HENT: Negative. Negative for congestion, ear pain and rhinorrhea. Eyes: Negative. Negative for pain and redness. Respiratory: Negative. Negative for cough, shortness of breath, wheezing and stridor. Cardiovascular: Negative. Negative for chest pain and leg swelling. Gastrointestinal: Positive for anal bleeding and constipation. Negative for abdominal pain, diarrhea, nausea, rectal pain and vomiting. Genitourinary: Negative. Negative for dysuria and frequency. Musculoskeletal: Negative. Negative for back pain and neck pain. Skin: Negative. Negative for rash and wound. Neurological: Negative. Negative for dizziness, seizures, syncope and headaches. All other systems reviewed and are negative. All Other Systems Negative  Physical Exam     Vitals:    11/21/20 1334   BP: 128/82   Pulse: 80   Resp: 18   Temp: 99.2 °F (37.3 °C)   SpO2: 100%   Weight: 90.7 kg (200 lb)   Height: 5' 5\" (1.651 m)     Physical Exam  Vitals signs and nursing note reviewed. Constitutional:       General: She is not in acute distress. Appearance: She is well-developed. She is not diaphoretic. HENT:      Head: Normocephalic and atraumatic. Eyes:      General: No scleral icterus. Right eye: No discharge. Left eye: No discharge. Conjunctiva/sclera: Conjunctivae normal.   Neck:      Musculoskeletal: Normal range of motion and neck supple. Cardiovascular:      Rate and Rhythm: Normal rate. Pulmonary:      Effort: Pulmonary effort is normal. No respiratory distress. Breath sounds: Normal breath sounds. No stridor. Abdominal:      Tenderness: There is no abdominal tenderness. There is no guarding. Genitourinary:     Comments: No external hemorrhoids noted, rectal vault is clear, no palpable mass. Guaiac negative. Musculoskeletal: Normal range of motion. Skin:     General: Skin is warm and dry. Findings: No erythema or rash. Neurological:      Mental Status: She is alert and oriented to person, place, and time. Coordination: Coordination normal.      Comments: Gait is steady and patient exhibits no evidence of ataxia. Patient is able to ambulate without difficulty. No focal neurological deficit noted. No facial droop, slurred speech, or evidence of altered mentation noted on exam.     Psychiatric:         Behavior: Behavior normal.         Thought Content: Thought content normal.                Diagnostic Study Results     Labs -     Recent Results (from the past 12 hour(s))   CBC WITH AUTOMATED DIFF    Collection Time: 11/21/20  2:20 PM   Result Value Ref Range    WBC 12.0 4.6 - 13.2 K/uL    RBC 3.95 (L) 4.20 - 5.30 M/uL    HGB 12.6 12.0 - 16.0 g/dL    HCT 37.4 35.0 - 45.0 %    MCV 94.7 74.0 - 97.0 FL    MCH 31.9 24.0 - 34.0 PG    MCHC 33.7 31.0 - 37.0 g/dL    RDW 14.8 (H) 11.6 - 14.5 %    PLATELET 526 (H) 170 - 420 K/uL    MPV 9.3 9.2 - 11.8 FL    NEUTROPHILS 71 40 - 73 %    LYMPHOCYTES 21 21 - 52 %    MONOCYTES 7 3 - 10 %    EOSINOPHILS 1 0 - 5 %    BASOPHILS 0 0 - 2 %    ABS. NEUTROPHILS 8.6 (H) 1.8 - 8.0 K/UL    ABS. LYMPHOCYTES 2.5 0.9 - 3.6 K/UL    ABS. MONOCYTES 0.8 0.05 - 1.2 K/UL    ABS. EOSINOPHILS 0.1 0.0 - 0.4 K/UL    ABS.  BASOPHILS 0.0 0.0 - 0.1 K/UL    DF AUTOMATED     METABOLIC PANEL, BASIC    Collection Time: 11/21/20  2:20 PM   Result Value Ref Range    Sodium 139 136 - 145 mmol/L    Potassium 4.6 3.5 - 5.5 mmol/L    Chloride 111 100 - 111 mmol/L    CO2 22 21 - 32 mmol/L    Anion gap 6 3.0 - 18 mmol/L    Glucose 79 74 - 99 mg/dL    BUN 12 7.0 - 18 MG/DL    Creatinine 1.08 0.6 - 1.3 MG/DL    BUN/Creatinine ratio 11 (L) 12 - 20      GFR est AA >60 >60 ml/min/1.73m2    GFR est non-AA >60 >60 ml/min/1.73m2    Calcium 8.8 8.5 - 10.1 MG/DL   HCG QL SERUM    Collection Time: 11/21/20  2:20 PM   Result Value Ref Range    HCG, Ql. Negative NEG     POC FECAL OCCULT BLOOD    Collection Time: 11/21/20  3:05 PM   Result Value Ref Range    Occult blood, stool (POC) Negative NEG         Radiologic Studies -   No orders to display     CT Results  (Last 48 hours)    None        CXR Results  (Last 48 hours)    None            Medical Decision Making   I am the first provider for this patient. I reviewed the vital signs, available nursing notes, past medical history, past surgical history, family history and social history. Vital Signs-Reviewed the patient's vital signs. Records Reviewed:Shannon Saba PA-C     Procedures:  Procedures    Provider Notes (Medical Decision Making): Impression:  Rectal bleeding    CBC/BMP unremarkable and hcg negative  Will plan to d/c with GI follow-up. Case management order placed to assist pt in establishing the appropriate care. Shannon Colindres PA-C       MED RECONCILIATION:  No current facility-administered medications for this encounter. Current Outpatient Medications   Medication Sig    lidocaine (XYLOCAINE) 4 % topical cream Apply  to affected area three (3) times daily as needed for Pain.  acetaminophen (TYLENOL) 500 mg tablet Take 2 Tabs by mouth every six (6) hours as needed for Fever.  ascorbic acid, vitamin C, (VITAMIN C) 500 mg tablet Take 1 Tab by mouth daily.  ferrous sulfate 325 mg (65 mg iron) tablet Take 1 Tab by mouth Daily (before breakfast).  ibuprofen (MOTRIN) 800 mg tablet Take 1 Tab by mouth every eight (8) hours as needed for Pain.  ARIPiprazole (ABILIFY) 5 mg tablet Take 1 Tab by mouth daily.  Indications: DEPRESSION TREATMENT ADJUNCT    buPROPion XL (WELLBUTRIN XL) 150 mg tablet Take 1 Tab by mouth every morning. Indications: major depressive disorder, Schizoaffective Disorder       Disposition:  D/c    DISCHARGE NOTE:   Patient is stable for discharge at this time. I have discussed all the findings from today's work up with the patient, including lab results and imaging. I have answered all questions. No new rx given. Rest and close follow-up with the GI specialist recommended this week. Return to the ED immediately for any new or worsening symptoms. Shannon Colindres PA-C     Follow-up Information     Follow up With Specialties Details Why Contact Info    [facility]  In 1 week  [address] [place] [address]  [phone] [id number] [facility] EMERGENCY DEPT Emergency Medicine  As needed, If symptoms worsen [address] [facility]  [phone]          Current Discharge Medication List              Diagnosis     Clinical Impression:   1.  Rectal bleeding

## 2021-04-05 ENCOUNTER — APPOINTMENT (OUTPATIENT)
Dept: GENERAL RADIOLOGY | Age: 26
End: 2021-04-05
Attending: EMERGENCY MEDICINE
Payer: MEDICAID

## 2021-04-05 ENCOUNTER — HOSPITAL ENCOUNTER (EMERGENCY)
Age: 26
Discharge: HOME | End: 2021-04-05
Attending: EMERGENCY MEDICINE
Payer: MEDICAID

## 2021-04-05 VITALS
HEIGHT: 65 IN | BODY MASS INDEX: 32.99 KG/M2 | DIASTOLIC BLOOD PRESSURE: 96 MMHG | SYSTOLIC BLOOD PRESSURE: 143 MMHG | HEART RATE: 93 BPM | TEMPERATURE: 98.2 F | WEIGHT: 198 LBS | OXYGEN SATURATION: 99 % | RESPIRATION RATE: 16 BRPM

## 2021-04-05 DIAGNOSIS — S29.019A THORACIC MYOFASCIAL STRAIN, INITIAL ENCOUNTER: ICD-10-CM
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
## 2021-04-05 PROCEDURE — 99282 EMERGENCY DEPT VISIT SF MDM: CPT

## 2021-04-05 PROCEDURE — 72072 X-RAY EXAM THORAC SPINE 3VWS: CPT

## 2021-04-05 RX ORDER — IBUPROFEN 800 MG/1
800 TABLET ORAL EVERY 8 HOURS
Qty: 15 TAB | Refills: 0 | Status: SHIPPED | OUTPATIENT
Start: 2021-04-05 | End: 2021-04-10

## 2021-04-05 RX ORDER — OXYCODONE AND ACETAMINOPHEN 5; 325 MG/1; MG/1
1 TABLET ORAL
Qty: 6 TAB | Refills: 0 | Status: SHIPPED | OUTPATIENT
Start: 2021-04-05 | End: 2021-04-08
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
## 2021-04-05 NOTE — LETTER
NOTIFICATION RETURN TO WORK / SCHOOL 
 
4/5/2021 8:24 PM 
 
Ms. Cher Carrion [address] [place] [id number] To Whom It May Concern: 
 
Cher Carrion is currently under the care of [id number] [facility] EMERGENCY DEPT. She will return to work/school on: 4/7/21. If there are questions or concerns please have the patient contact our office.  
 
 
 
Sincerely, 
 
 
Soo Brand PA-C
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
## 2021-04-06 ENCOUNTER — HOSPITAL ENCOUNTER (EMERGENCY)
Age: 26
Discharge: HOME | End: 2021-04-07
Attending: EMERGENCY MEDICINE
Payer: MEDICAID

## 2021-04-06 DIAGNOSIS — M54.6 ACUTE THORACIC BACK PAIN, UNSPECIFIED BACK PAIN LATERALITY: ICD-10-CM
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
## 2021-04-06 DIAGNOSIS — S69.91XA INJURY OF FINGER OF RIGHT HAND, INITIAL ENCOUNTER: ICD-10-CM

## 2021-04-06 PROCEDURE — 99283 EMERGENCY DEPT VISIT LOW MDM: CPT

## 2021-04-06 NOTE — Clinical Note
[facility] EMERGENCY DEPT 
[address] [facility] [address] 
[phone] Work/School Note Date: 4/6/2021 To Whom It May concern: 
 
Damari Ruiz was seen and treated today in the emergency room by the following provider(s): 
Attending Provider: Yadi Ramirez MD. Damari Ruiz is excused from work/school on 4/7/2021 through 4/10/2021. She is medically clear to return to work/school on 4/11/2021.   
  
 
Sincerely, 
 
 
 
 
eVe Goldstein MD
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
## 2021-04-07 VITALS
RESPIRATION RATE: 18 BRPM | HEART RATE: 72 BPM | SYSTOLIC BLOOD PRESSURE: 129 MMHG | WEIGHT: 198 LBS | DIASTOLIC BLOOD PRESSURE: 89 MMHG | OXYGEN SATURATION: 99 % | TEMPERATURE: 97.4 F | BODY MASS INDEX: 38.87 KG/M2 | HEIGHT: 60 IN
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
## 2021-04-07 NOTE — ED NOTES
Finger splint applied. Pulse palpable. Patient tolerated well. I have reviewed discharge and prescription instructions with the patient. The patient verbalized understanding. Denies pain, remains alert and ambulatory. No acute distress noted. Needs met.
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
## 2021-06-15 ENCOUNTER — HOSPITAL ENCOUNTER (EMERGENCY)
Age: 26
Discharge: HOME | End: 2021-06-15
Attending: EMERGENCY MEDICINE
Payer: MEDICAID

## 2021-06-15 VITALS
TEMPERATURE: 98.7 F | OXYGEN SATURATION: 99 % | BODY MASS INDEX: 32.99 KG/M2 | WEIGHT: 198 LBS | DIASTOLIC BLOOD PRESSURE: 83 MMHG | HEART RATE: 90 BPM | HEIGHT: 65 IN | RESPIRATION RATE: 18 BRPM | SYSTOLIC BLOOD PRESSURE: 136 MMHG

## 2021-06-15 DIAGNOSIS — K64.9 HEMORRHOIDS, UNSPECIFIED HEMORRHOID TYPE: Primary | ICD-10-CM

## 2021-06-15 PROCEDURE — 99282 EMERGENCY DEPT VISIT SF MDM: CPT

## 2021-06-15 RX ORDER — HYDROCORTISONE ACETATE 25 MG/1
25 SUPPOSITORY RECTAL EVERY 12 HOURS
Qty: 20 SUPPOSITORY | Refills: 0 | Status: SHIPPED | OUTPATIENT
Start: 2021-06-15 | End: 2021-06-25

## 2021-06-15 NOTE — DISCHARGE INSTRUCTIONS
Follow up with your PCP for further management of symptoms as well as colorectal surgeon. Return at anytime if symptoms worsen.

## 2021-06-15 NOTE — ED TRIAGE NOTES
Patient c/o noticing blood with defecation. She states rectal bleeding has occurred x 4 months. States stools are mostly soft.

## 2021-06-15 NOTE — ED NOTES
Socorro Murillo is a 23 y.o. female that was discharged in stable condition. The patients diagnosis, condition and treatment were explained to  patient and aftercare instructions were given. The patient verbalized understanding. Patient armband removed and shredded.

## 2021-06-15 NOTE — ED PROVIDER NOTES
EMERGENCY DEPARTMENT HISTORY AND PHYSICAL EXAM    1:29 PM      Date: 6/15/2021  Patient Name: Mehdi Pitt    History of Presenting Illness     Chief Complaint   Patient presents with    Rectal Bleeding         History Provided By: Patient    Additional History (Context): Mehdi Pitt is a 23 y.o. female with No significant past medical history who presents with complaint of having blood in her stool on and off for over 4 months. Patient reports sometimes when she has a bowel movement she noticed that there is bloating her tissue. Patient reported happened again today. Patient has not seen her PCP about this problem. Denies any nausea, vomiting, abdominal pain. Reports sometimes there is tenderness when she wipes. PCP: None        Past History     Past Medical History:  Past Medical History:   Diagnosis Date    STD (female)        Past Surgical History:  History reviewed. No pertinent surgical history. Family History:  History reviewed. No pertinent family history. Social History:  Social History     Tobacco Use    Smoking status: Current Every Day Smoker     Types: Cigarettes    Smokeless tobacco: Never Used   Substance Use Topics    Alcohol use: Yes     Comment: occasionally    Drug use: Yes     Types: Marijuana       Allergies: Allergies   Allergen Reactions    Pcn [Penicillins] Anaphylaxis     Patient denies allergy         Review of Systems       Review of Systems   Constitutional: Negative for chills and fever. Respiratory: Negative for shortness of breath. Cardiovascular: Negative for chest pain. Gastrointestinal: Positive for blood in stool. Negative for abdominal pain, nausea and vomiting. Skin: Negative for rash. Neurological: Negative for weakness. All other systems reviewed and are negative.         Physical Exam     Visit Vitals  /83 (BP 1 Location: Left upper arm, BP Patient Position: At rest)   Pulse 90   Temp 98.7 °F (37.1 °C)   Resp 18   Ht 5' 5\" (1.651 m) Wt 89.8 kg (198 lb)   LMP 06/14/2021   SpO2 99%   Breastfeeding No   BMI 32.95 kg/m²         Physical Exam  Vitals reviewed. Exam conducted with a chaperone present (Constantino Gayle RN). Constitutional:       General: She is not in acute distress. Appearance: Normal appearance. She is well-developed. She is not ill-appearing or toxic-appearing. Comments: Patient no acute distress. Nontoxic looking. HENT:      Head: Normocephalic and atraumatic. Eyes:      Conjunctiva/sclera: Conjunctivae normal.      Pupils: Pupils are equal, round, and reactive to light. Neck:      Trachea: Trachea normal.   Cardiovascular:      Rate and Rhythm: Normal rate and regular rhythm. Pulmonary:      Effort: Pulmonary effort is normal.      Breath sounds: Normal breath sounds. Abdominal:      General: Bowel sounds are normal. There is no distension or abdominal bruit. Palpations: Abdomen is soft. Abdomen is not rigid. There is no shifting dullness, fluid wave, mass or pulsatile mass. Tenderness: There is no abdominal tenderness. There is no guarding. Negative signs include Littlejohn's sign and McBurney's sign. Genitourinary:     Rectum: External hemorrhoid present. No anal fissure. Comments: Small pea-sized hemorrhoid at 7:00'clock, slightly tender to touch. Soft. No obvious fissures. No active bleeding. Musculoskeletal:      Cervical back: Normal range of motion. Neurological:      General: No focal deficit present. Mental Status: She is alert and oriented to person, place, and time. Mental status is at baseline. Diagnostic Study Results     Labs -  No results found for this or any previous visit (from the past 12 hour(s)). Radiologic Studies -   No orders to display         Medical Decision Making   I am the first provider for this patient.     I reviewed the vital signs, available nursing notes, past medical history, past surgical history, family history and social history. Vital Signs-Reviewed the patient's vital signs. Records Reviewed: Nursing Notes and Old Medical Records (Time of Review: 1:29 PM)    ED Course: Progress Notes, Reevaluation, and Consults:    Provider Notes (Medical Decision Making):   Physical exam findings consistent with external hemorrhoid. No signs of fissures. Patient provided with prescription for Anusol and advised to follow-up with the colorectal surgeon for further management of her symptoms. Patient to follow-up with her PCP. I do not suspect GI bleed at this time. Patient given strict return precaution if any symptoms worsen. Diagnosis     Clinical Impression:   1. Hemorrhoids, unspecified hemorrhoid type        Disposition: home     Follow-up Information     Follow up With Specialties Details Why Contact Info    Nabil Bob MD Colon and Rectal Surgery Schedule an appointment as soon as possible for a visit in 3 days  [phone]  [phone]      [address] [facility] EMERGENCY DEPT Emergency Medicine  If symptoms worsen [address] [facility]  [phone]           Discharge Medication List as of 6/15/2021  1:34 PM      START taking these medications    Details   hydrocortisone (Anusol-HC) 25 mg supp Insert 1 Suppository into rectum every twelve (12) hours for 10 days. , Print, Disp-20 Suppository, R-0             Dictation disclaimer:  Please note that this dictation was completed with [employer], the computer voice recognition software. Quite often unanticipated grammatical, syntax, homophones, and other interpretive errors are inadvertently transcribed by the computer software. Please disregard these errors. Please excuse any errors that have escaped final proofreading.

## 2021-09-06 ENCOUNTER — HOSPITAL ENCOUNTER (EMERGENCY)
Age: 26
Discharge: HOME | End: 2021-09-06
Attending: EMERGENCY MEDICINE
Payer: MEDICAID

## 2021-09-06 VITALS
OXYGEN SATURATION: 98 % | WEIGHT: 194 LBS | BODY MASS INDEX: 32.32 KG/M2 | DIASTOLIC BLOOD PRESSURE: 98 MMHG | HEART RATE: 79 BPM | HEIGHT: 65 IN | SYSTOLIC BLOOD PRESSURE: 135 MMHG | RESPIRATION RATE: 20 BRPM | TEMPERATURE: 98.2 F

## 2021-09-06 DIAGNOSIS — K04.7 DENTAL INFECTION: Primary | ICD-10-CM

## 2021-09-06 PROCEDURE — 99283 EMERGENCY DEPT VISIT LOW MDM: CPT

## 2021-09-06 PROCEDURE — 74011250637 HC RX REV CODE- 250/637: Performed by: EMERGENCY MEDICINE

## 2021-09-06 RX ORDER — IBUPROFEN 600 MG/1
600 TABLET ORAL
Qty: 20 TABLET | Refills: 0 | Status: SHIPPED | OUTPATIENT
Start: 2021-09-06 | End: 2021-11-13

## 2021-09-06 RX ORDER — CLINDAMYCIN HYDROCHLORIDE 300 MG/1
300 CAPSULE ORAL 4 TIMES DAILY
Qty: 28 CAPSULE | Refills: 0 | Status: SHIPPED | OUTPATIENT
Start: 2021-09-06 | End: 2021-09-13

## 2021-09-06 RX ORDER — CLINDAMYCIN HYDROCHLORIDE 150 MG/1
300 CAPSULE ORAL
Status: COMPLETED | OUTPATIENT
Start: 2021-09-06 | End: 2021-09-06

## 2021-09-06 RX ORDER — HYDROCODONE BITARTRATE AND ACETAMINOPHEN 5; 325 MG/1; MG/1
1 TABLET ORAL ONCE
Status: COMPLETED | OUTPATIENT
Start: 2021-09-06 | End: 2021-09-06

## 2021-09-06 RX ORDER — HYDROCODONE BITARTRATE AND ACETAMINOPHEN 5; 325 MG/1; MG/1
1 TABLET ORAL
Qty: 6 TABLET | Refills: 0 | Status: SHIPPED | OUTPATIENT
Start: 2021-09-06 | End: 2021-09-09

## 2021-09-06 RX ADMIN — CLINDAMYCIN HYDROCHLORIDE 300 MG: 150 CAPSULE ORAL at 20:14

## 2021-09-06 RX ADMIN — HYDROCODONE BITARTRATE AND ACETAMINOPHEN 1 TABLET: 5; 325 TABLET ORAL at 20:14

## 2021-09-06 NOTE — ED PROVIDER NOTES
EMERGENCY DEPARTMENT HISTORY AND PHYSICAL EXAM    7:43 PM  Date: 9/6/2021  Patient Name: Josr Rowe    History of Presenting Illness     Chief Complaint   Patient presents with    Dental Pain        History Provided By: Patient    HPI: Josr Rowe is a 23 y.o. female with no significant past medical problems. Patient is presenting with right lower dental pain for the past 4 days. She has been taken ibuprofen and Tylenol at home without relief. Denies previous similar episodes. No history of fever, headache or facial swelling. Patient does not have a dentist.    Location:  Severity:  Timing/course: Onset/Duration:     PCP: None    Past History     Past Medical History:  Past Medical History:   Diagnosis Date    STD (female)        Past Surgical History:  History reviewed. No pertinent surgical history. Family History:  History reviewed. No pertinent family history. Social History:  Social History     Tobacco Use    Smoking status: Current Every Day Smoker     Types: Cigarettes    Smokeless tobacco: Never Used   Substance Use Topics    Alcohol use: Yes     Comment: occasionally    Drug use: Yes     Types: Marijuana       Allergies: Allergies   Allergen Reactions    Pcn [Penicillins] Anaphylaxis     Patient denies allergy       Review of Systems   Review of Systems   HENT: Positive for dental problem. All other systems reviewed and are negative. Physical Exam     Patient Vitals for the past 12 hrs:   Temp Pulse Resp BP SpO2   09/06/21 1901 98.2 °F (36.8 °C) 79 20 (!) 135/98 98 %       Physical Exam  Vitals and nursing note reviewed. Constitutional:       Appearance: Normal appearance. HENT:      Head: Normocephalic and atraumatic. Mouth/Throat:     Eyes:      Extraocular Movements: Extraocular movements intact. Cardiovascular:      Rate and Rhythm: Normal rate. Pulmonary:      Effort: Pulmonary effort is normal. No respiratory distress.    Musculoskeletal: General: Normal range of motion. Cervical back: Normal range of motion and neck supple. No rigidity. Skin:     General: Skin is warm and dry. Findings: No erythema. Neurological:      General: No focal deficit present. Mental Status: She is alert. Psychiatric:         Mood and Affect: Mood normal.         Behavior: Behavior normal.         Diagnostic Study Results     Labs -  No results found for this or any previous visit (from the past 12 hour(s)). Radiologic Studies -   No results found. Medical Decision Making     ED Course: Progress Notes, Reevaluation, and Consults:    7:43 PM Initial assessment performed. The patients presenting problems have been discussed, and they/their family are in agreement with the care plan formulated and outlined with them. I have encouraged them to ask questions as they arise throughout their visit. Provider Notes (Medical Decision Making): 81-year-old female presenting with right lower dental pain. Right lower second and third molars with severe dental caries and surrounding gingivitis without evidence of dental abscess. Will start on antibiotics, symptomatic pain control. Instructed the patient to use mouthwash for salt water gargle as well as follow-up closely with a dentist.  Provided with return precautions. Procedures:     Critical Care Time:     Vital Signs-Reviewed the patient's vital signs. Reviewed pt's pulse ox reading. EKG: Interpreted by the EP. Time Interpreted:    Rate:    Rhythm:    Interpretation:   Comparison:     Records Reviewed: Nursing Notes (Time of Review: 7:43 PM)  -I am the first provider for this patient.  -I reviewed the vital signs, available nursing notes, past medical history, past surgical history, family history and social history. Clinical Impression     Clinical Impression: No diagnosis found.     Disposition: dc          This note was dictated utilizing voice recognition software which may lead to typographical errors. I apologize in advance if the situation occurs. If questions arise please do not hesitate to contact me or call our department.     Nandini Rivas MD  7:43 PM

## 2021-09-06 NOTE — ED TRIAGE NOTES
Patient c/o dental pain to right lower back tooth. She states cold air aggravates pain. C/o pain x 4 days. Statement Selected

## 2021-09-07 NOTE — ROUTINE PROCESS
Josr Room is a 23 y.o. female that was discharged in stable. Pt was accompanied by friend. Pt is not driving. The patients diagnosis, condition and treatment were explained to  patient and aftercare instructions were given. The patient verbalized understanding. Patient armband removed and shredded.

## 2021-11-13 ENCOUNTER — HOSPITAL ENCOUNTER (EMERGENCY)
Age: 26
Discharge: HOME | End: 2021-11-13
Attending: EMERGENCY MEDICINE
Payer: MEDICAID

## 2021-11-13 VITALS
WEIGHT: 186 LBS | HEART RATE: 81 BPM | DIASTOLIC BLOOD PRESSURE: 98 MMHG | HEIGHT: 65 IN | OXYGEN SATURATION: 99 % | TEMPERATURE: 97.5 F | BODY MASS INDEX: 30.99 KG/M2 | SYSTOLIC BLOOD PRESSURE: 152 MMHG | RESPIRATION RATE: 16 BRPM

## 2021-11-13 DIAGNOSIS — L03.011 PARONYCHIA OF RIGHT MIDDLE FINGER: Primary | ICD-10-CM

## 2021-11-13 PROCEDURE — 75810000293 HC SIMP/SUPERF WND  RPR

## 2021-11-13 PROCEDURE — 74011250637 HC RX REV CODE- 250/637: Performed by: EMERGENCY MEDICINE

## 2021-11-13 PROCEDURE — 99283 EMERGENCY DEPT VISIT LOW MDM: CPT

## 2021-11-13 PROCEDURE — 75810000451 HC DRAIN ABSC FINGER SIMPLE

## 2021-11-13 RX ORDER — TRAMADOL HYDROCHLORIDE 50 MG/1
50 TABLET ORAL
Status: COMPLETED | OUTPATIENT
Start: 2021-11-13 | End: 2021-11-13

## 2021-11-13 RX ORDER — CLINDAMYCIN HYDROCHLORIDE 150 MG/1
300 CAPSULE ORAL
Status: COMPLETED | OUTPATIENT
Start: 2021-11-13 | End: 2021-11-13

## 2021-11-13 RX ORDER — TRAMADOL HYDROCHLORIDE 50 MG/1
50 TABLET ORAL
Qty: 12 TABLET | Refills: 0 | Status: CANCELLED | OUTPATIENT
Start: 2021-11-13 | End: 2021-11-16

## 2021-11-13 RX ORDER — TRAMADOL HYDROCHLORIDE 50 MG/1
50 TABLET ORAL
Qty: 12 TABLET | Refills: 0 | Status: SHIPPED | OUTPATIENT
Start: 2021-11-13 | End: 2021-11-16

## 2021-11-13 RX ORDER — CLINDAMYCIN HYDROCHLORIDE 300 MG/1
300 CAPSULE ORAL 4 TIMES DAILY
Qty: 40 CAPSULE | Refills: 0 | Status: CANCELLED | OUTPATIENT
Start: 2021-11-13 | End: 2021-11-23

## 2021-11-13 RX ORDER — CLINDAMYCIN HYDROCHLORIDE 300 MG/1
300 CAPSULE ORAL 4 TIMES DAILY
Qty: 40 CAPSULE | Refills: 0 | Status: SHIPPED | OUTPATIENT
Start: 2021-11-13 | End: 2021-11-23

## 2021-11-13 RX ORDER — ETONOGESTREL AND ETHINYL ESTRADIOL 11.7; 2.7 MG/1; MG/1
INSERT, EXTENDED RELEASE VAGINAL
COMMUNITY
Start: 2021-08-31

## 2021-11-13 RX ADMIN — TRAMADOL HYDROCHLORIDE 50 MG: 50 TABLET, COATED ORAL at 23:03

## 2021-11-13 RX ADMIN — CLINDAMYCIN HYDROCHLORIDE 300 MG: 150 CAPSULE ORAL at 23:03

## 2021-11-13 NOTE — ED TRIAGE NOTES
Patient c/o swelling to right third finger. She states swelling to area surrounding fingernail x 3 days. No drainage noted from affected finger at present.  Patient requests Nuvaring refill

## 2021-11-14 NOTE — ED NOTES
Awaiting I&D. Purposeful rounding completed:  Side rails up x 2:  YES  Bed in low position and wheels locked: YES  Call bell within reach: YES  Comfort addressed: YES    Toileting needs addressed: YES  Plan of care reviewed/updated with patient and or family members: YES  IV site assessed: YES  Pain assessed and addressed: YES  Will continue to monitor.

## 2021-11-14 NOTE — ED PROVIDER NOTES
HPI 80-year-old female presents to ER with complaint of having swelling of her right middle finger nailbed x3 days that is painful. No fever or chills. Past Medical History:   Diagnosis Date    Hemorrhoid     STD (female)        History reviewed. No pertinent surgical history. History reviewed. No pertinent family history. Social History     Socioeconomic History    Marital status: SINGLE     Spouse name: Not on file    Number of children: Not on file    Years of education: Not on file    Highest education level: Not on file   Occupational History    Not on file   Tobacco Use    Smoking status: Current Every Day Smoker     Types: Cigarettes    Smokeless tobacco: Never Used   Substance and Sexual Activity    Alcohol use: Yes     Comment: occasionally    Drug use: Yes     Types: Marijuana    Sexual activity: Yes     Partners: Male     Birth control/protection: Inserts     Comment: nuvaring   Other Topics Concern     Service Not Asked    Blood Transfusions Not Asked    Caffeine Concern Not Asked    Occupational Exposure Not Asked    Hobby Hazards Not Asked    Sleep Concern Not Asked    Stress Concern Not Asked    Weight Concern Not Asked    Special Diet Not Asked    Back Care Not Asked    Exercise Not Asked    Bike Helmet Not Asked    Seat Belt Not Asked    Self-Exams Not Asked   Social History Narrative    Not on file     Social Determinants of Health     Financial Resource Strain:     Difficulty of Paying Living Expenses: Not on file   Food Insecurity:     Worried About Running Out of Food in the Last Year: Not on file    Yady of Food in the Last Year: Not on file   Transportation Needs:     Lack of Transportation (Medical): Not on file    Lack of Transportation (Non-Medical):  Not on file   Physical Activity:     Days of Exercise per Week: Not on file    Minutes of Exercise per Session: Not on file   Stress:     Feeling of Stress : Not on file   Social Connections:     Frequency of Communication with Friends and Family: Not on file    Frequency of Social Gatherings with Friends and Family: Not on file    Attends Zoroastrian Services: Not on file    Active Member of Clubs or Organizations: Not on file    Attends Club or Organization Meetings: Not on file    Marital Status: Not on file   Intimate Partner Violence:     Fear of Current or Ex-Partner: Not on file    Emotionally Abused: Not on file    Physically Abused: Not on file    Sexually Abused: Not on file   Housing Stability:     Unable to Pay for Housing in the Last Year: Not on file    Number of [place] in the Last Year: Not on file    Unstable Housing in the Last Year: Not on file         ALLERGIES: Pcn [penicillins]    Review of Systems   Constitutional: Negative. Musculoskeletal: Positive for arthralgias (right middle finger). Vitals:    11/13/21 1836   BP: (!) 152/98   Pulse: 81   Resp: 16   Temp: 97.5 °F (36.4 °C)   SpO2: 99%   Weight: 84.4 kg (186 lb)   Height: 5' 5\" (1.651 m)            Physical Exam  Constitutional:       General: She is in acute distress. Musculoskeletal:      Comments: RIGHT MIDDLE FINGER: (+) edema and tenderness over nailbed, normal ROM, pulses and sensory. Neurological:      Mental Status: She is alert.           MDM  Number of Diagnoses or Management Options  Risk of Complications, Morbidity, and/or Mortality  Presenting problems: moderate  Diagnostic procedures: moderate  Management options: moderate    Patient Progress  Patient progress: improved         I&D Abcess Simple    Date/Time: 11/13/2021 10:51 PM  Performed by: Allen Modi MD  Authorized by: Allen Modi MD     Consent:     Consent obtained:  Written    Consent given by:  Patient    Risks discussed:  Incomplete drainage, pain and infection    Alternatives discussed:  No treatment, delayed treatment and observation  Pre-procedure details:     Skin preparation: Hibiclens  Anesthesia (see MAR for exact dosages): Anesthesia method:  Nerve block    Block needle gauge:  27 G    Block anesthetic:  Lidocaine 1% w/o epi    Block injection procedure:  Anatomic landmarks identified, introduced needle, anatomic landmarks palpated and negative aspiration for blood    Block outcome:  Anesthesia achieved  Procedure type:     Complexity:  Simple  Procedure details:     Needle aspiration: no      Incision types:  Stab incision    Scalpel blade:  11    Drainage:  Serosanguinous    Drainage amount: Moderate    Wound treatment:  Wound left open    Packing materials:  None  Post-procedure details:     Patient tolerance of procedure: Tolerated well, no immediate complications      Dx: incision and drainage of paronychia    Disp; D/C home. Rx: clindamycin, tramadol. F/U PCP in 2 days for wound check. Return to ER prn. Dictation disclaimer:  Please note that this dictation was completed with [employer], the computer voice recognition software. Quite often unanticipated grammatical, syntax, homophones, and other interpretive errors are inadvertently transcribed by the computer software. Please disregard these errors. Please excuse any errors that have escaped final proofreading.

## 2022-01-01 ENCOUNTER — HOSPITAL ENCOUNTER (EMERGENCY)
Age: 27
Discharge: HOME | End: 2022-01-01
Attending: STUDENT IN AN ORGANIZED HEALTH CARE EDUCATION/TRAINING PROGRAM
Payer: MEDICAID

## 2022-01-01 VITALS
HEIGHT: 65 IN | TEMPERATURE: 98.5 F | SYSTOLIC BLOOD PRESSURE: 131 MMHG | DIASTOLIC BLOOD PRESSURE: 94 MMHG | WEIGHT: 180 LBS | RESPIRATION RATE: 17 BRPM | OXYGEN SATURATION: 98 % | HEART RATE: 92 BPM | BODY MASS INDEX: 29.99 KG/M2

## 2022-01-01 DIAGNOSIS — R43.0 LOSS OF SMELL: ICD-10-CM

## 2022-01-01 DIAGNOSIS — Z20.822 SUSPECTED COVID-19 VIRUS INFECTION: Primary | ICD-10-CM

## 2022-01-01 LAB — SARS-COV-2, COV2: NORMAL

## 2022-01-01 PROCEDURE — U0003 INFECTIOUS AGENT DETECTION BY NUCLEIC ACID (DNA OR RNA); SEVERE ACUTE RESPIRATORY SYNDROME CORONAVIRUS 2 (SARS-COV-2) (CORONAVIRUS DISEASE [COVID-19]), AMPLIFIED PROBE TECHNIQUE, MAKING USE OF HIGH THROUGHPUT TECHNOLOGIES AS DESCRIBED BY CMS-2020-01-R: HCPCS

## 2022-01-01 PROCEDURE — 99282 EMERGENCY DEPT VISIT SF MDM: CPT

## 2022-01-01 NOTE — DISCHARGE INSTRUCTIONS
[facility] Guidance for Preventing the Spread of Coronavirus Disease 2019 (COVID-19) in Homes and Residential Communities  Prevention steps for:  People with confirmed or suspected COVID-19 (including persons under investigation) who do not need to be hospitalized  and  People with confirmed COVID-19 who were hospitalized and determined to be medically stable to go home  Your healthcare provider and public health staff will evaluate whether you can be cared for at home. If it is determined that you do not need to be hospitalized and can be isolated at home, you will be monitored by staff from your local or state health department. You should follow the prevention steps below until a healthcare provider or local or state health department says you can return to your normal activities. Stay home except to get medical care  You should restrict activities outside your home, except for getting medical care. Do not go to work, school, or public areas. Avoid using public transportation, ride-sharing, or taxis. Separate yourself from other people and animals in your home  People: As much as possible, you should stay in a specific room and away from other people in your home. Also, you should use a separate bathroom, if available. Animals: You should restrict contact with pets and other animals while you are sick with COVID-19, just like you would around other people. Although there have not been reports of pets or other animals becoming sick with COVID-19, it is still recommended that people sick with COVID-19 limit contact with animals until more information is known about the virus. When possible, have another member of your household care for your animals while you are sick. If you are sick with COVID-19, avoid contact with your pet, including petting, snuggling, being kissed or licked, and sharing food.  If you must care for your pet or be around animals while you are sick, wash your hands before and after you interact with pets and wear a facemask. Call ahead before visiting your doctor  If you have a medical appointment, call the healthcare provider and tell them that you have or may have COVID-19. This will help the healthcare provider's office take steps to keep other people from getting infected or exposed. Wear a facemask  You should wear a facemask when you are around other people (e.g., sharing a room or vehicle) or pets and before you enter a healthcare provider's office. If you are not able to wear a facemask (for example, because it causes trouble breathing), then people who live with you should not stay in the same room with you, or they should wear a facemask if they enter your room. Cover your coughs and sneezes  Cover your mouth and nose with a tissue when you cough or sneeze. Throw used tissues in a lined trash can; immediately wash your hands with soap and water for at least 20 seconds or clean your hands with an alcohol-based hand  that contains 60 to 95% alcohol, covering all surfaces of your hands and rubbing them together until they feel dry. Soap and water should be used preferentially if hands are visibly dirty. Clean your hands often  Wash your hands often with soap and water for at least 20 seconds or clean your hands with an alcohol-based hand  that contains 60 to 95% alcohol, covering all surfaces of your hands and rubbing them together until they feel dry. Soap and water should be used preferentially if hands are visibly dirty. Avoid touching your eyes, nose, and mouth with unwashed hands. Avoid sharing personal household items  You should not share dishes, drinking glasses, cups, eating utensils, towels, or bedding with other people or pets in your home. After using these items, they should be washed thoroughly with soap and water.   Clean all high-touch surfaces everyday  High touch surfaces include counters, tabletops, doorknobs, bathroom fixtures, toilets, phones, keyboards, tablets, and bedside tables. Also, clean any surfaces that may have blood, stool, or body fluids on them. Use a household cleaning spray or wipe, according to the label instructions. Labels contain instructions for safe and effective use of the cleaning product including precautions you should take when applying the product, such as wearing gloves and making sure you have good ventilation during use of the product. Monitor your symptoms  Seek prompt medical attention if your illness is worsening (e.g., difficulty breathing). Before seeking care, call your healthcare provider and tell them that you have, or are being evaluated for, COVID-19. Put on a facemask before you enter the facility. These steps will help the healthcare provider's office to keep other people in the office or waiting room from getting infected or exposed. Ask your healthcare provider to call the local or state health department. Persons who are placed under active monitoring or facilitated self-monitoring should follow instructions provided by their local health department or occupational health professionals, as appropriate. If you have a medical emergency and need to call 911, notify the dispatch personnel that you have, or are being evaluated for COVID-19. If possible, put on a facemask before emergency medical services arrive. Discontinuing home isolation  Patients with confirmed COVID-19 should remain under home isolation precautions until the risk of secondary transmission to others is thought to be low. The decision to discontinue home isolation precautions should be made on a case-by-case basis, in consultation with healthcare providers and state and local health departments.   Recommended precautions for household members, intimate partners, and caregivers in a nonhealthcare setting of  A patient with symptomatic laboratory-confirmed COVID-19  or  A patient under investigation  Household members, intimate partners, and caregivers in a nonhealthcare setting may have close contact2 with a person with symptomatic, laboratory-confirmed COVID-19 or a person under investigation. Close contacts should monitor their health; they should call their healthcare provider right away if they develop symptoms suggestive of COVID-19 (e.g., fever, cough, shortness of breath)   Close contacts should also follow these recommendations:  Make sure that you understand and can help the patient follow their healthcare provider's instructions for medication(s) and care. You should help the patient with basic needs in the home and provide support for getting groceries, prescriptions, and other personal needs. Monitor the patient's symptoms. If the patient is getting sicker, call his or her healthcare provider and tell them that the patient has laboratory-confirmed COVID-19. This will help the healthcare provider's office take steps to keep other people in the office or waiting room from getting infected. Ask the healthcare provider to call the local or [facility] health department for additional guidance. If the patient has a medical emergency and you need to call 911, notify the dispatch personnel that the patient has, or is being evaluated for COVID-19. Household members should stay in another room or be  from the patient as much as possible. Household members should use a separate bedroom and bathroom, if available. Prohibit visitors who do not have an essential need to be in the home. Household members should care for any pets in the home. Do not handle pets or other animals while sick. Make sure that shared spaces in the home have good air flow, such as by an air conditioner or an opened window, weather permitting. Perform hand hygiene frequently.  Wash your hands often with soap and water for at least 20 seconds or use an alcohol-based hand  that contains 60 to 95% alcohol, covering all surfaces of your hands and rubbing them together until they feel dry. Soap and water should be used preferentially if hands are visibly dirty. Avoid touching your eyes, nose, and mouth with unwashed hands. You and the patient should wear a facemask if you are in the same room. Wear a disposable facemask and gloves when you touch or have contact with the patient's blood, stool, or body fluids, such as saliva, sputum, nasal mucus, vomit, urine. Throw out disposable facemasks and gloves after using them. Do not reuse. When removing personal protective equipment, first remove and dispose of gloves. Then, immediately clean your hands with soap and water or alcohol-based hand . Next, remove and dispose of facemask, and immediately clean your hands again with soap and water or alcohol-based hand . Avoid sharing household items with the patient. You should not share dishes, drinking glasses, cups, eating utensils, towels, bedding, or other items. After the patient uses these items, you should wash them thoroughly (see below [employer]). Clean all high-touch surfaces, such as counters, tabletops, doorknobs, bathroom fixtures, toilets, phones, keyboards, tablets, and bedside tables, every day. Also, clean any surfaces that may have blood, stool, or body fluids on them. Use a household cleaning spray or wipe, according to the label instructions. Labels contain instructions for safe and effective use of the cleaning product including precautions you should take when applying the product, such as wearing gloves and making sure you have good ventilation during use of the product. [address] thoroughly. Immediately remove and wash clothes or bedding that have blood, stool, or body fluids on them. Wear disposable gloves while handling soiled items and keep soiled items away from your body. Clean your hands (with soap and water or an alcohol-based hand ) immediately after removing your gloves.   Read and follow directions on labels of laundry or clothing items and detergent. In general, using a normal laundry detergent according to washing machine instructions and dry thoroughly using the warmest temperatures recommended on the clothing label. Place all used disposable gloves, facemasks, and other contaminated items in a lined container before disposing of them with other household waste. Clean your hands (with soap and water or an alcohol-based hand ) immediately after handling these items. Soap and water should be used preferentially if hands are visibly dirty. Discuss any additional questions with your state or local health department or healthcare provider. Footnotes  2Close contact is defined as--  a) being within approximately 6 feet (2 meters) of a COVID-19 case for a prolonged period of time; close contact can occur while caring for, living with, visiting, or sharing a health care waiting area or room with a COVID-19 case  - or -  b) having direct contact with infectious secretions of a COVID-19 case (e.g., being coughed on).   Page last reviewed: February 18, 2020   Content source: [facility] Division of Viral Diseases

## 2022-01-01 NOTE — ED PROVIDER NOTES
EMERGENCY DEPARTMENT HISTORY AND PHYSICAL EXAM      Date: 1/1/2022  Patient Name: Kendall Khanna    History of Presenting Illness     Chief Complaint   Patient presents with    Other       History Provided By: Patient    HPI: Kendall Khanna, 23 y.o. female presents to the ED with CC of loss of smell that started yesterday. Her boyfriend tested positive for COVID 19. She has not been vaccinated. No OTC meds. Nothing makes it better or worse. Patient denies SOB, chest pain, or any neurological symptoms. There are no other complaints, changes, or physical findings at this time. Past History     Past Medical History:  Past Medical History:   Diagnosis Date    Hemorrhoid     STD (female)        Allergies: Allergies   Allergen Reactions    Pcn [Penicillins] Anaphylaxis     Patient denies allergy       Review of Systems   Vital signs and nursing notes reviewed  Review of Systems   Constitutional: Negative for diaphoresis and fever. HENT: Negative for ear pain and sore throat. Loss of smell   Eyes:        No acute change in vision   Respiratory: Negative for cough and shortness of breath. Cardiovascular: Negative for chest pain and leg swelling. Gastrointestinal: Negative for abdominal pain and vomiting. Genitourinary: Negative for dysuria. Musculoskeletal: Negative for neck pain. Skin: Negative for wound. Neurological: Negative for weakness and headaches. Physical Exam     Visit Vitals  BP (!) 131/94 (BP 1 Location: Left upper arm, BP Patient Position: At rest)   Pulse 92   Temp 98.5 °F (36.9 °C)   Resp 17   Ht 5' 5\" (1.651 m)   Wt 81.6 kg (180 lb)   SpO2 98%   BMI 29.95 kg/m²     CONSTITUTIONAL: Alert, in no distress. Appears stated age. HEAD:  Normocephalic, atraumatic  EYES: EOM intact. No conjunctival injection or scleral icterus  Neck:  Supple. No meningismus  RESP: Normal with no work of breathing, speaking in full sentences. CV: Well perfused.    NEURO: Alert with normal mentation, moving extremities spontaneously  PSYCH: Normal mood, normal affect    Medical Decision Making   Patient presents for COVID 19 testing with normal oxygen saturation and mild URI symptoms or COVID 19 exposure. COVID 19 testing was not conducted. The patient was given quarantine/isolation recommendations and agrees with the plan to be discharged home. They were provided instructions to return for difficulty breathing, chest pain, altered mentation, or any other new or worsening symptoms. ED Course:   Initial assessment performed. The patients presenting problems have been discussed, and they are in agreement with the care plan formulated and outlined with them. I have encouraged them to ask questions as they arise throughout their visit. Critical Care Time: None    Disposition:  DISCHARGE NOTE:  The pt is ready for discharge. The pt's signs, symptoms, diagnosis, and discharge instructions have been discussed and pt has conveyed their understanding. The pt is to follow up as recommended or return to ER should their symptoms worsen. Plan has been discussed and pt is in agreement. PLAN:  1. Current Discharge Medication List        2. Follow-up Information    None       3. COVID Testing results will be called once available if positive. Patient should utilize [employer] to access results. 4. Take Tylenol or Ibuprofen as needed  5. Drink plenty of fluids  6. Return to ED if worse especially if any shortness of breath, chest pain or altered mentation. Diagnosis     Clinical Impression:   1. Suspected COVID-19 virus infection    2. Loss of smell            Please note that this dictation was completed with [employer], the computer voice recognition software. Quite often unanticipated grammatical, syntax, homophones, and other interpretive errors are inadvertently transcribed by the computer software. Please disregards these errors. Please excuse any errors that have escaped final proofreading.

## 2022-01-01 NOTE — Clinical Note
[address] [facility] EMERGENCY DEPT  [address] [address] [phone]    Work/School Note    Date: 1/1/2022     To Whom It May concern:    Azar Jenkins was evaluated by the following provider(s):  Attending Provider: Ines Jacques MD.   COVID19 virus is suspected. Per the CDC guidelines we recommend home isolation until the following conditions are all met:    1. At least five days have passed since symptoms first appeared and/or had a close exposure,   2. After home isolation for five days, wearing a mask around others for the next five days,  3. At least 24 have passed since last fever without the use of fever-reducing medications and  4.  Symptoms (eg cough, shortness of breath) have improved      Sincerely,          Varghese Hernandez MD

## 2022-01-01 NOTE — ED NOTES
Pt discharged at this time. This RN reviewed discharge instructions at this time with the pt, & pt does not have any questions. Pt ambulatory upon discharge, & in stable condition. Pt armband removed & shredded. Pt given work note.

## 2022-01-01 NOTE — ED TRIAGE NOTES
Pt c/o of loss of taste/smell starting two days ago. Pt's boyfriend tested positive for COVID 3-4 days ago.

## 2022-01-05 LAB — SARS-COV-2, COV2NT: NOT DETECTED

## 2022-01-12 ENCOUNTER — HOSPITAL ENCOUNTER (EMERGENCY)
Age: 27
Discharge: HOME | End: 2022-01-12
Attending: EMERGENCY MEDICINE
Payer: MEDICAID

## 2022-01-12 VITALS
BODY MASS INDEX: 29.99 KG/M2 | TEMPERATURE: 98.4 F | HEART RATE: 84 BPM | DIASTOLIC BLOOD PRESSURE: 85 MMHG | OXYGEN SATURATION: 97 % | HEIGHT: 65 IN | WEIGHT: 180 LBS | RESPIRATION RATE: 18 BRPM | SYSTOLIC BLOOD PRESSURE: 119 MMHG

## 2022-01-12 DIAGNOSIS — K02.9 PAIN DUE TO DENTAL CARIES: Primary | ICD-10-CM

## 2022-01-12 PROCEDURE — 99283 EMERGENCY DEPT VISIT LOW MDM: CPT

## 2022-01-12 PROCEDURE — 74011250637 HC RX REV CODE- 250/637: Performed by: EMERGENCY MEDICINE

## 2022-01-12 RX ORDER — CLINDAMYCIN HYDROCHLORIDE 300 MG/1
300 CAPSULE ORAL 4 TIMES DAILY
Qty: 28 CAPSULE | Refills: 0 | Status: SHIPPED | OUTPATIENT
Start: 2022-01-12 | End: 2022-01-19

## 2022-01-12 RX ORDER — HYDROCODONE BITARTRATE AND ACETAMINOPHEN 5; 325 MG/1; MG/1
1 TABLET ORAL ONCE
Status: COMPLETED | OUTPATIENT
Start: 2022-01-12 | End: 2022-01-12

## 2022-01-12 RX ORDER — IBUPROFEN 600 MG/1
600 TABLET ORAL
Qty: 20 TABLET | Refills: 0 | Status: SHIPPED | OUTPATIENT
Start: 2022-01-12

## 2022-01-12 RX ADMIN — HYDROCODONE BITARTRATE AND ACETAMINOPHEN 1 TABLET: 5; 325 TABLET ORAL at 04:31

## 2022-01-12 NOTE — ED PROVIDER NOTES
EMERGENCY DEPARTMENT HISTORY AND PHYSICAL EXAM    4:21 AM  Date: 1/12/2022  Patient Name: Marshall Chapa    History of Presenting Illness     Chief Complaint   Patient presents with    Dental Pain        History Provided By: Patient    HPI: Marshall Chapa is a 33 y.o. female with no significant past medical problems. Patient is presenting with severe dental pain that started few hours ago. She took Tylenol for pain without relief. Pain has been constant and nonradiating. She had pain in her same tooth before but never followed up. History of fever headache or other constitutional symptoms. Location:  Severity:  Timing/course: Onset/Duration:     PCP: Other, MD José Miguel    Past History     Past Medical History:  Past Medical History:   Diagnosis Date    Hemorrhoid     STD (female)        Past Surgical History:  History reviewed. No pertinent surgical history. Family History:  History reviewed. No pertinent family history. Social History:  Social History     Tobacco Use    Smoking status: Current Every Day Smoker     Packs/day: 0.25     Types: Cigarettes    Smokeless tobacco: Never Used   Substance Use Topics    Alcohol use: Yes     Comment: occasionally    Drug use: Yes     Types: Marijuana       Allergies: Allergies   Allergen Reactions    Pcn [Penicillins] Anaphylaxis     Patient denies allergy       Review of Systems   Review of Systems   HENT: Positive for dental problem. All other systems reviewed and are negative. Physical Exam     Patient Vitals for the past 12 hrs:   Temp Pulse Resp BP SpO2   01/12/22 0336 98.4 °F (36.9 °C) 84 18 119/85 97 %       Physical Exam  Vitals and nursing note reviewed. Constitutional:       Appearance: Normal appearance. HENT:      Head: Normocephalic and atraumatic. Mouth/Throat:     Eyes:      Extraocular Movements: Extraocular movements intact. Cardiovascular:      Rate and Rhythm: Normal rate.    Pulmonary:      Effort: Pulmonary effort is normal. No respiratory distress. Musculoskeletal:         General: Normal range of motion. Cervical back: Normal range of motion and neck supple. Skin:     General: Skin is warm and dry. Neurological:      General: No focal deficit present. Mental Status: She is alert and oriented to person, place, and time. Psychiatric:         Mood and Affect: Mood normal.         Behavior: Behavior normal.         Diagnostic Study Results     Labs -  No results found for this or any previous visit (from the past 12 hour(s)). Radiologic Studies -   No results found. Medical Decision Making     ED Course: Progress Notes, Reevaluation, and Consults:    4:21 AM Initial assessment performed. The patients presenting problems have been discussed, and they/their family are in agreement with the care plan formulated and outlined with them. I have encouraged them to ask questions as they arise throughout their visit. Provider Notes (Medical Decision Making): 52-year-old female with no significant past medical problems presenting with dental pain for few hours. Well-appearing on exam and not in distress. Dental caries with tooth decay and surrounding gingivitis without an abscess. We will treat symptomatically and add antibiotics. Patient was advised to follow-up with a dentist closely. Divided with care instructions and return precautions. Procedures:     Critical Care Time:     Vital Signs-Reviewed the patient's vital signs. Reviewed pt's pulse ox reading. EKG: Interpreted by the EP. Time Interpreted:    Rate:    Rhythm:    Interpretation:   Comparison:     Records Reviewed: Nursing Notes (Time of Review: 4:21 AM)  -I am the first provider for this patient.  -I reviewed the vital signs, available nursing notes, past medical history, past surgical history, family history and social history.     Current Outpatient Medications   Medication Sig Dispense Refill    ethinyl estradiol-etonogestrel (NUVARING) 0.12-0.015 mg/24 hr vaginal ring INSERT 1 RING VAGINALLY AS DIRECTED. REMOVE AFTER 3 WEEKS & WAIT 7 DAYS BEFORE INSERTING A NEW RING          Clinical Impression     Clinical Impression: No diagnosis found. Disposition: dc      This note was dictated utilizing voice recognition software which may lead to typographical errors. I apologize in advance if the situation occurs. If questions arise please do not hesitate to contact me or call our department.     Alannah Barraza MD  4:21 AM

## 2022-01-12 NOTE — PROGRESS NOTES
Patient discharged home ambulatory. Discharge instructions given, all questions answered. Patient would like to stay longer and receive antibiotics. States she will be back later.

## 2022-03-19 PROBLEM — F32.9 MAJOR DEPRESSION: Status: ACTIVE | Noted: 2018-07-25

## 2022-03-19 PROBLEM — Z34.90 PREGNANCY: Status: ACTIVE | Noted: 2019-05-02

## 2022-03-19 PROBLEM — O09.33 NO PRENATAL CARE IN CURRENT PREGNANCY IN THIRD TRIMESTER: Status: ACTIVE | Noted: 2019-05-02

## 2022-08-26 ENCOUNTER — HOSPITAL ENCOUNTER (EMERGENCY)
Age: 27
Discharge: HOME | End: 2022-08-26
Attending: EMERGENCY MEDICINE
Payer: MEDICAID

## 2022-08-26 ENCOUNTER — APPOINTMENT (OUTPATIENT)
Dept: CT IMAGING | Age: 27
End: 2022-08-26
Attending: EMERGENCY MEDICINE
Payer: MEDICAID

## 2022-08-26 VITALS
HEART RATE: 88 BPM | OXYGEN SATURATION: 99 % | DIASTOLIC BLOOD PRESSURE: 104 MMHG | SYSTOLIC BLOOD PRESSURE: 125 MMHG | TEMPERATURE: 97.6 F | RESPIRATION RATE: 16 BRPM

## 2022-08-26 DIAGNOSIS — R10.13 ABDOMINAL PAIN, EPIGASTRIC: Primary | ICD-10-CM

## 2022-08-26 LAB
ALBUMIN SERPL-MCNC: 3.3 G/DL (ref 3.4–5)
ALBUMIN/GLOB SERPL: 0.8 {RATIO} (ref 0.8–1.7)
ALP SERPL-CCNC: 91 U/L (ref 45–117)
ALT SERPL-CCNC: 38 U/L (ref 13–56)
ANION GAP SERPL CALC-SCNC: 6 MMOL/L (ref 3–18)
APPEARANCE UR: CLEAR
AST SERPL-CCNC: 21 U/L (ref 10–38)
BACTERIA URNS QL MICRO: ABNORMAL /HPF
BASOPHILS # BLD: 0 K/UL (ref 0–0.1)
BASOPHILS NFR BLD: 0 % (ref 0–2)
BILIRUB SERPL-MCNC: 0.1 MG/DL (ref 0.2–1)
BILIRUB UR QL: NEGATIVE
BUN SERPL-MCNC: 12 MG/DL (ref 7–18)
BUN/CREAT SERPL: 18 (ref 12–20)
CALCIUM SERPL-MCNC: 8.2 MG/DL (ref 8.5–10.1)
CHLORIDE SERPL-SCNC: 109 MMOL/L (ref 100–111)
CO2 SERPL-SCNC: 24 MMOL/L (ref 21–32)
COLOR UR: YELLOW
CREAT SERPL-MCNC: 0.67 MG/DL (ref 0.6–1.3)
DIFFERENTIAL METHOD BLD: ABNORMAL
EOSINOPHIL # BLD: 0.1 K/UL (ref 0–0.4)
EOSINOPHIL NFR BLD: 1 % (ref 0–5)
EPITH CASTS URNS QL MICRO: ABNORMAL /LPF (ref 0–5)
ERYTHROCYTE [DISTWIDTH] IN BLOOD BY AUTOMATED COUNT: 12.9 % (ref 11.6–14.5)
GLOBULIN SER CALC-MCNC: 4 G/DL (ref 2–4)
GLUCOSE SERPL-MCNC: 106 MG/DL (ref 74–99)
GLUCOSE UR STRIP.AUTO-MCNC: NEGATIVE MG/DL
HCG UR QL: NEGATIVE
HCT VFR BLD AUTO: 40.5 % (ref 35–45)
HGB BLD-MCNC: 14 G/DL (ref 12–16)
HGB UR QL STRIP: NEGATIVE
IMM GRANULOCYTES # BLD AUTO: 0.1 K/UL (ref 0–0.04)
IMM GRANULOCYTES NFR BLD AUTO: 0 % (ref 0–0.5)
KETONES UR QL STRIP.AUTO: NEGATIVE MG/DL
LEUKOCYTE ESTERASE UR QL STRIP.AUTO: ABNORMAL
LYMPHOCYTES # BLD: 1.4 K/UL (ref 0.9–3.6)
LYMPHOCYTES NFR BLD: 12 % (ref 21–52)
MCH RBC QN AUTO: 33.1 PG (ref 24–34)
MCHC RBC AUTO-ENTMCNC: 34.6 G/DL (ref 31–37)
MCV RBC AUTO: 95.7 FL (ref 78–100)
MONOCYTES # BLD: 0.5 K/UL (ref 0.05–1.2)
MONOCYTES NFR BLD: 4 % (ref 3–10)
NEUTS SEG # BLD: 10 K/UL (ref 1.8–8)
NEUTS SEG NFR BLD: 83 % (ref 40–73)
NITRITE UR QL STRIP.AUTO: NEGATIVE
NRBC # BLD: 0 K/UL (ref 0–0.01)
NRBC BLD-RTO: 0 PER 100 WBC
PH UR STRIP: 6.5 [PH] (ref 5–8)
PLATELET # BLD AUTO: 368 K/UL (ref 135–420)
PMV BLD AUTO: 9.3 FL (ref 9.2–11.8)
POTASSIUM SERPL-SCNC: 3.7 MMOL/L (ref 3.5–5.5)
PROT SERPL-MCNC: 7.3 G/DL (ref 6.4–8.2)
PROT UR STRIP-MCNC: 30 MG/DL
RBC # BLD AUTO: 4.23 M/UL (ref 4.2–5.3)
RBC #/AREA URNS HPF: NEGATIVE /HPF (ref 0–5)
SODIUM SERPL-SCNC: 139 MMOL/L (ref 136–145)
SP GR UR REFRACTOMETRY: 1.02 (ref 1–1.03)
UROBILINOGEN UR QL STRIP.AUTO: 0.2 EU/DL (ref 0.2–1)
WBC # BLD AUTO: 12.1 K/UL (ref 4.6–13.2)
WBC URNS QL MICRO: ABNORMAL /HPF (ref 0–4)
YEAST URNS QL MICRO: ABNORMAL

## 2022-08-26 PROCEDURE — 74177 CT ABD & PELVIS W/CONTRAST: CPT

## 2022-08-26 PROCEDURE — 74011250636 HC RX REV CODE- 250/636: Performed by: EMERGENCY MEDICINE

## 2022-08-26 PROCEDURE — 99285 EMERGENCY DEPT VISIT HI MDM: CPT

## 2022-08-26 PROCEDURE — 96375 TX/PRO/DX INJ NEW DRUG ADDON: CPT

## 2022-08-26 PROCEDURE — 81001 URINALYSIS AUTO W/SCOPE: CPT

## 2022-08-26 PROCEDURE — 74011000636 HC RX REV CODE- 636: Performed by: EMERGENCY MEDICINE

## 2022-08-26 PROCEDURE — 80053 COMPREHEN METABOLIC PANEL: CPT

## 2022-08-26 PROCEDURE — 81025 URINE PREGNANCY TEST: CPT

## 2022-08-26 PROCEDURE — 85025 COMPLETE CBC W/AUTO DIFF WBC: CPT

## 2022-08-26 PROCEDURE — 96374 THER/PROPH/DIAG INJ IV PUSH: CPT

## 2022-08-26 PROCEDURE — 74011000250 HC RX REV CODE- 250: Performed by: EMERGENCY MEDICINE

## 2022-08-26 RX ORDER — FAMOTIDINE 20 MG/1
20 TABLET, FILM COATED ORAL DAILY
Qty: 14 TABLET | Refills: 0 | Status: SHIPPED | OUTPATIENT
Start: 2022-08-26 | End: 2022-09-09

## 2022-08-26 RX ORDER — ONDANSETRON 4 MG/1
4 TABLET, ORALLY DISINTEGRATING ORAL
Qty: 12 TABLET | Refills: 0 | Status: SHIPPED | OUTPATIENT
Start: 2022-08-26 | End: 2022-08-31

## 2022-08-26 RX ORDER — MORPHINE SULFATE 4 MG/ML
4 INJECTION INTRAVENOUS ONCE
Status: COMPLETED | OUTPATIENT
Start: 2022-08-26 | End: 2022-08-26

## 2022-08-26 RX ORDER — DICYCLOMINE HYDROCHLORIDE 20 MG/1
20 TABLET ORAL
Qty: 15 TABLET | Refills: 0 | Status: SHIPPED | OUTPATIENT
Start: 2022-08-26 | End: 2022-08-30

## 2022-08-26 RX ORDER — ONDANSETRON 2 MG/ML
4 INJECTION INTRAMUSCULAR; INTRAVENOUS ONCE
Status: COMPLETED | OUTPATIENT
Start: 2022-08-26 | End: 2022-08-26

## 2022-08-26 RX ADMIN — ONDANSETRON 4 MG: 2 INJECTION INTRAMUSCULAR; INTRAVENOUS at 09:48

## 2022-08-26 RX ADMIN — FAMOTIDINE 20 MG: 10 INJECTION, SOLUTION INTRAVENOUS at 09:48

## 2022-08-26 RX ADMIN — IOPAMIDOL 80 ML: 612 INJECTION, SOLUTION INTRAVENOUS at 11:23

## 2022-08-26 RX ADMIN — MORPHINE SULFATE 4 MG: 4 INJECTION, SOLUTION INTRAMUSCULAR; INTRAVENOUS at 09:48

## 2022-08-26 RX ADMIN — SODIUM CHLORIDE 1000 ML: 9 INJECTION, SOLUTION INTRAVENOUS at 09:53

## 2022-08-26 NOTE — ED PROVIDER NOTES
EMERGENCY DEPARTMENT HISTORY AND PHYSICAL EXAM    9:40 AM      Date: 8/26/2022  Patient Name: Sugar Hendrix    History of Presenting Illness     Chief Complaint   Patient presents with    Abdominal Pain         History Provided By: Patient  Location/Duration/Severity/Modifying factors   61-year-old female denies significant past medical history, presents today with complaints of abdominal pain, began overnight, states she ate [place] food last evening, daily fried rice and or chicken. No one else consumed the same food. She did have episode of nausea and vomiting x1, denies any diarrhea. She complains of sharp type epigastric pain, nonradiating. No chest pain or difficulty breathing. Denies any dysuria. Attempted to take an OTC medication reportedly for nausea, however had vomiting after taking this. She denies any recent cough or congestion. No history of gastritis/GERD/PUD. Patient having difficulty achieving position of comfort, no clear relieving factors. Abdominal Pain   Associated symptoms include nausea and vomiting. Pertinent negatives include no fever, no diarrhea, no dysuria, no hematuria and no chest pain. PCP: Magno, MD José Miguel    Current Outpatient Medications   Medication Sig Dispense Refill    famotidine (Pepcid) 20 mg tablet Take 1 Tablet by mouth daily for 14 days. 14 Tablet 0    dicyclomine (BENTYL) 20 mg tablet Take 1 Tablet by mouth every six (6) hours as needed for Cramping or Pain for up to 4 days. Indications: abdominal pain/cramping 15 Tablet 0    ondansetron (ZOFRAN ODT) 4 mg disintegrating tablet Take 1 Tablet by mouth every eight (8) hours as needed for Nausea or Vomiting for up to 5 days. 12 Tablet 0    ibuprofen (MOTRIN) 600 mg tablet Take 1 Tablet by mouth every six (6) hours as needed for Pain. 20 Tablet 0    ethinyl estradiol-etonogestrel (NUVARING) 0.12-0.015 mg/24 hr vaginal ring INSERT 1 RING VAGINALLY AS DIRECTED.  REMOVE AFTER 3 WEEKS & WAIT 7 DAYS BEFORE INSERTING A NEW RING         Past History     Past Medical History:  Past Medical History:   Diagnosis Date    Hemorrhoid     STD (female)        Past Surgical History:  No past surgical history on file. Family History:  No family history on file. Social History:  Social History     Tobacco Use    Smoking status: Every Day     Packs/day: 0.25     Types: Cigarettes    Smokeless tobacco: Never   Substance Use Topics    Alcohol use: Yes     Comment: occasionally    Drug use: Yes     Types: Marijuana       Allergies: Allergies   Allergen Reactions    Pcn [Penicillins] Anaphylaxis     Patient denies allergy         Review of Systems       Review of Systems   Constitutional:  Negative for fever. HENT:  Negative for congestion and sore throat. Eyes:  Negative for visual disturbance. Respiratory:  Negative for shortness of breath. Cardiovascular:  Negative for chest pain and leg swelling. Gastrointestinal:  Positive for abdominal pain, nausea and vomiting. Negative for diarrhea. Genitourinary:  Negative for dysuria and hematuria. Skin:  Negative for rash. Neurological:  Negative for syncope and weakness. Psychiatric/Behavioral:  Negative for confusion. Physical Exam   Visit Vitals  BP (!) 125/104   Pulse 88   Temp 97.6 °F (36.4 °C)   Resp 16   SpO2 99%         Physical Exam  Nursing note and vitals reviewed. General appearance: non-toxic, mildly distressed and uncomfortable  Eyes: PERRL, EOMI, anicteric sclera  HEENT: NCAT, face mask in place  Pulmonary: clear to auscultation bilaterally  Cardiac: normal rate and regular rhythm, no murmurs, gallops, or rubs, 2+DP pulses, 2+ radial pulses  Abdomen: soft, palpation in the epigastrium, nondistended, bowel sounds present, no rebound, not rigid. Negative Littlejohn sign, negative McBurney's point.   MSK: no pre-tibial edema  Neuro: Alert, answers questions appropriately  Skin: capillary refill brisk      Diagnostic Study Results     Labs -  Recent Results (from the past 12 hour(s))   URINALYSIS W/ RFLX MICROSCOPIC    Collection Time: 08/26/22  8:37 AM   Result Value Ref Range    Color YELLOW      Appearance CLEAR      Specific gravity 1.021 1.005 - 1.030      pH (UA) 6.5 5.0 - 8.0      Protein 30 (A) NEG mg/dL    Glucose Negative NEG mg/dL    Ketone Negative NEG mg/dL    Bilirubin Negative NEG      Blood Negative NEG      Urobilinogen 0.2 0.2 - 1.0 EU/dL    Nitrites Negative NEG      Leukocyte Esterase SMALL (A) NEG     HCG URINE, QL    Collection Time: 08/26/22  8:37 AM   Result Value Ref Range    HCG urine, QL Negative NEG     CBC WITH AUTOMATED DIFF    Collection Time: 08/26/22  8:37 AM   Result Value Ref Range    WBC 12.1 4.6 - 13.2 K/uL    RBC 4.23 4.20 - 5.30 M/uL    HGB 14.0 12.0 - 16.0 g/dL    HCT 40.5 35.0 - 45.0 %    MCV 95.7 78.0 - 100.0 FL    MCH 33.1 24.0 - 34.0 PG    MCHC 34.6 31.0 - 37.0 g/dL    RDW 12.9 11.6 - 14.5 %    PLATELET 187 209 - 445 K/uL    MPV 9.3 9.2 - 11.8 FL    NRBC 0.0 0  WBC    ABSOLUTE NRBC 0.00 0.00 - 0.01 K/uL    NEUTROPHILS 83 (H) 40 - 73 %    LYMPHOCYTES 12 (L) 21 - 52 %    MONOCYTES 4 3 - 10 %    EOSINOPHILS 1 0 - 5 %    BASOPHILS 0 0 - 2 %    IMMATURE GRANULOCYTES 0 0.0 - 0.5 %    ABS. NEUTROPHILS 10.0 (H) 1.8 - 8.0 K/UL    ABS. LYMPHOCYTES 1.4 0.9 - 3.6 K/UL    ABS. MONOCYTES 0.5 0.05 - 1.2 K/UL    ABS. EOSINOPHILS 0.1 0.0 - 0.4 K/UL    ABS. BASOPHILS 0.0 0.0 - 0.1 K/UL    ABS. IMM.  GRANS. 0.1 (H) 0.00 - 0.04 K/UL    DF AUTOMATED     METABOLIC PANEL, COMPREHENSIVE    Collection Time: 08/26/22  8:37 AM   Result Value Ref Range    Sodium 139 136 - 145 mmol/L    Potassium 3.7 3.5 - 5.5 mmol/L    Chloride 109 100 - 111 mmol/L    CO2 24 21 - 32 mmol/L    Anion gap 6 3.0 - 18 mmol/L    Glucose 106 (H) 74 - 99 mg/dL    BUN 12 7.0 - 18 MG/DL    Creatinine 0.67 0.6 - 1.3 MG/DL    BUN/Creatinine ratio 18 12 - 20      GFR est AA >60 >60 ml/min/1.73m2    GFR est non-AA >60 >60 ml/min/1.73m2    Calcium 8.2 (L) 8.5 - 10.1 MG/DL    Bilirubin, total 0.1 (L) 0.2 - 1.0 MG/DL    ALT (SGPT) 38 13 - 56 U/L    AST (SGOT) 21 10 - 38 U/L    Alk. phosphatase 91 45 - 117 U/L    Protein, total 7.3 6.4 - 8.2 g/dL    Albumin 3.3 (L) 3.4 - 5.0 g/dL    Globulin 4.0 2.0 - 4.0 g/dL    A-G Ratio 0.8 0.8 - 1.7     URINE MICROSCOPIC ONLY    Collection Time: 08/26/22  8:37 AM   Result Value Ref Range    WBC 1 to 4 0 - 4 /hpf    RBC Negative 0 - 5 /hpf    Epithelial cells 4+ 0 - 5 /lpf    Bacteria 2+ (A) NEG /hpf    Yeast 1+ (A) NEG       Radiologic Studies -   CT ABD PELV W CONT   Final Result      1. No significant diagnostic abnormality explain patient's symptom. No bowel   obstruction or inflammation. 2. Diverticulosis coli. 3. Tiny nonobstructive right renal calculus. Likely small right renal cortical   cysts. 4. Small geographic hypodensity in the left hepatic lobe, favorable for focal   fatty infiltration. Thank you for this referral.             Medical Decision Making   I am the first provider for this patient. I reviewed the vital signs, available nursing notes, past medical history, past surgical history, family history and social history. Vital Signs-Reviewed the patient's vital signs. EKG:     Records Reviewed: Nursing Notes (Time of Review: 9:40 AM)    ED Course: Progress Notes, Reevaluation, and Consults:         Provider Notes (Medical Decision Making):   MDM  Number of Diagnoses or Management Options  Abdominal pain, epigastric  Diagnosis management comments: 69-year-old female presents with points of epigastric pain after eating  cues seen, stating she had fried rice and orange chicken. She had an episode of nausea and vomiting. Abdominal examination reveals epigastric tenderness, however no peritoneal signs. There is no focal right upper quadrant pain. Labs reviewed and reassuring, awaiting urinalysis and CT abdomen pelvis.     .    Procedures    Critical Care Time: 0      Diagnosis     Clinical Impression: 1. Abdominal pain, epigastric        Disposition: discharge    Follow-up Information       Follow up With Specialties Details Why 3 Alexx Gilbert  Schedule an appointment as soon as possible for a visit in 1 week  Migdalia Jimenez [id number]  [phone]    [facility] Emergency Medicine Go in 1 day If symptoms worsen [address] [facility] [address]  [phone]             Discharge Medication List as of 8/26/2022  2:15 PM        START taking these medications    Details   famotidine (Pepcid) 20 mg tablet Take 1 Tablet by mouth daily for 14 days. , Normal, Disp-14 Tablet, R-0      dicyclomine (BENTYL) 20 mg tablet Take 1 Tablet by mouth every six (6) hours as needed for Cramping or Pain for up to 4 days. Indications: abdominal pain/cramping, Normal, Disp-15 Tablet, R-0      ondansetron (ZOFRAN ODT) 4 mg disintegrating tablet Take 1 Tablet by mouth every eight (8) hours as needed for Nausea or Vomiting for up to 5 days. , Normal, Disp-12 Tablet, R-0           CONTINUE these medications which have NOT CHANGED    Details   ibuprofen (MOTRIN) 600 mg tablet Take 1 Tablet by mouth every six (6) hours as needed for Pain., Normal, Disp-20 Tablet, R-0      ethinyl estradiol-etonogestrel (NUVARING) 0.12-0.015 mg/24 hr vaginal ring INSERT 1 RING VAGINALLY AS DIRECTED. REMOVE AFTER 3 WEEKS & WAIT 7 DAYS BEFORE INSERTING A NEW RING, Historical Med           Disclaimer: Sections of this note are dictated using utilizing voice recognition software. Minor typographical errors may be present. If questions arise, please do not hesitate to contact me or call our department.

## 2022-08-26 NOTE — Clinical Note
[facility]  [id number] [address] [phone]    Work/School Note    Date: 8/26/2022    To Whom It May concern:    Campbell Che was seen and treated today in the emergency room by the following provider(s):  Attending Provider: Del Watts MD.      Campbell Che is excused from work/school on 08/26/22 and 08/27/22. She is medically clear to return to work/school on 8/28/2022. Sincerely,          Jami Henning.  Augusto Mo MD

## 2025-09-01 ENCOUNTER — APPOINTMENT (OUTPATIENT)
Facility: HOSPITAL | Age: 30
End: 2025-09-01
Payer: MEDICAID

## 2025-09-01 ENCOUNTER — HOSPITAL ENCOUNTER (EMERGENCY)
Facility: HOSPITAL | Age: 30
Discharge: HOME | End: 2025-09-02
Payer: MEDICAID

## 2025-09-01 VITALS
RESPIRATION RATE: 20 BRPM | HEART RATE: 83 BPM | WEIGHT: 230 LBS | SYSTOLIC BLOOD PRESSURE: 151 MMHG | BODY MASS INDEX: 38.32 KG/M2 | OXYGEN SATURATION: 100 % | TEMPERATURE: 98.8 F | DIASTOLIC BLOOD PRESSURE: 99 MMHG | HEIGHT: 65 IN
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
## 2025-09-01 DIAGNOSIS — S16.1XXA STRAIN OF NECK MUSCLE, INITIAL ENCOUNTER: ICD-10-CM

## 2025-09-01 DIAGNOSIS — S39.012A BACK STRAIN, INITIAL ENCOUNTER: ICD-10-CM

## 2025-09-01 PROCEDURE — 72070 X-RAY EXAM THORAC SPINE 2VWS: CPT

## 2025-09-01 PROCEDURE — 72040 X-RAY EXAM NECK SPINE 2-3 VW: CPT

## 2025-09-01 PROCEDURE — 72100 X-RAY EXAM L-S SPINE 2/3 VWS: CPT

## 2025-09-01 PROCEDURE — 99283 EMERGENCY DEPT VISIT LOW MDM: CPT

## 2025-09-01 RX ORDER — NAPROXEN 500 MG/1
500 TABLET ORAL 2 TIMES DAILY
Qty: 30 TABLET | Refills: 0 | Status: SHIPPED | OUTPATIENT
Start: 2025-09-01

## 2025-09-01 RX ORDER — METHOCARBAMOL 750 MG/1
750 TABLET, FILM COATED ORAL EVERY 6 HOURS PRN
Qty: 20 TABLET | Refills: 0 | Status: SHIPPED | OUTPATIENT
Start: 2025-09-01 | End: 2025-09-11

## 2025-09-01 RX ORDER — LIDOCAINE 50 MG/G
1 PATCH TOPICAL DAILY
Qty: 30 PATCH | Refills: 0 | Status: SHIPPED | OUTPATIENT
Start: 2025-09-01 | End: 2025-10-01

## 2025-09-01 ASSESSMENT — PAIN - FUNCTIONAL ASSESSMENT: PAIN_FUNCTIONAL_ASSESSMENT: 0-10

## 2025-09-01 ASSESSMENT — ENCOUNTER SYMPTOMS
SHORTNESS OF BREATH: 0
ABDOMINAL PAIN: 0
SORE THROAT: 0
WHEEZING: 0
NAUSEA: 0
BACK PAIN: 1
RHINORRHEA: 0
VOMITING: 0
EYE DISCHARGE: 0
EYE REDNESS: 0
STRIDOR: 0

## 2025-09-01 ASSESSMENT — PAIN SCALES - GENERAL: PAINLEVEL_OUTOF10: 9
